# Patient Record
Sex: MALE | Race: WHITE | Employment: PART TIME | URBAN - METROPOLITAN AREA
[De-identification: names, ages, dates, MRNs, and addresses within clinical notes are randomized per-mention and may not be internally consistent; named-entity substitution may affect disease eponyms.]

---

## 2017-01-05 ENCOUNTER — OFFICE VISIT (OUTPATIENT)
Dept: FAMILY MEDICINE CLINIC | Age: 27
End: 2017-01-05

## 2017-01-05 DIAGNOSIS — L03.011 PARONYCHIA, RIGHT: Primary | ICD-10-CM

## 2017-01-05 PROCEDURE — 99213 OFFICE O/P EST LOW 20 MIN: CPT | Performed by: NURSE PRACTITIONER

## 2017-01-05 RX ORDER — DOXYCYCLINE HYCLATE 100 MG
100 TABLET ORAL 2 TIMES DAILY
Qty: 20 TABLET | Refills: 0 | Status: SHIPPED | OUTPATIENT
Start: 2017-01-05 | End: 2017-01-15

## 2017-01-05 RX ORDER — MUPIROCIN CALCIUM 20 MG/G
CREAM TOPICAL
Qty: 1 TUBE | Refills: 0 | Status: SHIPPED | OUTPATIENT
Start: 2017-01-05 | End: 2017-02-04

## 2017-01-06 VITALS
SYSTOLIC BLOOD PRESSURE: 120 MMHG | RESPIRATION RATE: 18 BRPM | OXYGEN SATURATION: 97 % | BODY MASS INDEX: 25.81 KG/M2 | WEIGHT: 212 LBS | DIASTOLIC BLOOD PRESSURE: 74 MMHG | TEMPERATURE: 98.2 F | HEART RATE: 122 BPM

## 2017-01-16 ENCOUNTER — TELEPHONE (OUTPATIENT)
Dept: FAMILY MEDICINE CLINIC | Age: 27
End: 2017-01-16

## 2017-01-17 ENCOUNTER — OFFICE VISIT (OUTPATIENT)
Dept: FAMILY MEDICINE CLINIC | Age: 27
End: 2017-01-17

## 2017-01-17 VITALS
SYSTOLIC BLOOD PRESSURE: 126 MMHG | TEMPERATURE: 97.9 F | WEIGHT: 212 LBS | HEART RATE: 112 BPM | OXYGEN SATURATION: 97 % | RESPIRATION RATE: 18 BRPM | BODY MASS INDEX: 25.81 KG/M2 | DIASTOLIC BLOOD PRESSURE: 80 MMHG

## 2017-01-17 DIAGNOSIS — Z01.818 PRE-OP EXAM: ICD-10-CM

## 2017-01-17 DIAGNOSIS — L60.0 INGROWN NAIL: Primary | ICD-10-CM

## 2017-01-17 DIAGNOSIS — F64.0 GENDER DYSPHORIA IN ADULT: ICD-10-CM

## 2017-01-17 PROCEDURE — 99213 OFFICE O/P EST LOW 20 MIN: CPT | Performed by: NURSE PRACTITIONER

## 2017-01-17 ASSESSMENT — ENCOUNTER SYMPTOMS
SPUTUM PRODUCTION: 0
RESPIRATORY NEGATIVE: 1
SHORTNESS OF BREATH: 0

## 2017-01-27 RX ORDER — HYDROCODONE BITARTRATE AND ACETAMINOPHEN 5; 325 MG/1; MG/1
1 TABLET ORAL 2 TIMES DAILY PRN
Qty: 60 TABLET | Refills: 0 | Status: SHIPPED | OUTPATIENT
Start: 2017-01-27 | End: 2017-02-28 | Stop reason: SDUPTHER

## 2017-02-13 ENCOUNTER — OFFICE VISIT (OUTPATIENT)
Dept: FAMILY MEDICINE CLINIC | Age: 27
End: 2017-02-13

## 2017-02-13 VITALS
DIASTOLIC BLOOD PRESSURE: 86 MMHG | BODY MASS INDEX: 26.54 KG/M2 | SYSTOLIC BLOOD PRESSURE: 124 MMHG | OXYGEN SATURATION: 97 % | WEIGHT: 218 LBS | HEART RATE: 112 BPM

## 2017-02-13 DIAGNOSIS — M25.552 CHRONIC HIP PAIN, LEFT: Primary | ICD-10-CM

## 2017-02-13 DIAGNOSIS — Q79.62 EHLERS-DANLOS SYNDROME, BENIGN HYPERMOBILE FORM: ICD-10-CM

## 2017-02-13 DIAGNOSIS — G89.29 CHRONIC HIP PAIN, LEFT: Primary | ICD-10-CM

## 2017-02-13 PROCEDURE — 99213 OFFICE O/P EST LOW 20 MIN: CPT | Performed by: INTERNAL MEDICINE

## 2017-02-28 RX ORDER — HYDROCODONE BITARTRATE AND ACETAMINOPHEN 5; 325 MG/1; MG/1
1 TABLET ORAL 2 TIMES DAILY PRN
Qty: 60 TABLET | Refills: 0 | Status: SHIPPED | OUTPATIENT
Start: 2017-02-28 | End: 2017-03-30 | Stop reason: SDUPTHER

## 2017-04-05 RX ORDER — HYDROCODONE BITARTRATE AND ACETAMINOPHEN 5; 325 MG/1; MG/1
1 TABLET ORAL 2 TIMES DAILY PRN
Qty: 60 TABLET | Refills: 0 | Status: SHIPPED | OUTPATIENT
Start: 2017-04-05 | End: 2017-05-02 | Stop reason: SDUPTHER

## 2017-05-04 RX ORDER — HYDROCODONE BITARTRATE AND ACETAMINOPHEN 5; 325 MG/1; MG/1
1 TABLET ORAL 2 TIMES DAILY PRN
Qty: 60 TABLET | Refills: 0 | Status: SHIPPED | OUTPATIENT
Start: 2017-05-04 | End: 2017-06-19 | Stop reason: SDUPTHER

## 2017-06-19 ENCOUNTER — OFFICE VISIT (OUTPATIENT)
Dept: FAMILY MEDICINE CLINIC | Age: 27
End: 2017-06-19

## 2017-06-19 VITALS
DIASTOLIC BLOOD PRESSURE: 80 MMHG | HEART RATE: 104 BPM | OXYGEN SATURATION: 98 % | RESPIRATION RATE: 16 BRPM | BODY MASS INDEX: 26.55 KG/M2 | SYSTOLIC BLOOD PRESSURE: 126 MMHG | HEIGHT: 76 IN | WEIGHT: 218 LBS

## 2017-06-19 DIAGNOSIS — G89.29 CHRONIC HIP PAIN, LEFT: ICD-10-CM

## 2017-06-19 DIAGNOSIS — Q79.60 EHLERS-DANLOS DISEASE: Primary | ICD-10-CM

## 2017-06-19 DIAGNOSIS — M25.552 CHRONIC HIP PAIN, LEFT: ICD-10-CM

## 2017-06-19 PROCEDURE — 99213 OFFICE O/P EST LOW 20 MIN: CPT | Performed by: NURSE PRACTITIONER

## 2017-06-19 RX ORDER — BUPROPION HYDROCHLORIDE 300 MG/1
TABLET ORAL
Refills: 2 | COMMUNITY
Start: 2017-05-15 | End: 2020-01-21 | Stop reason: ALTCHOICE

## 2017-06-19 RX ORDER — HYDROCODONE BITARTRATE AND ACETAMINOPHEN 5; 325 MG/1; MG/1
1 TABLET ORAL 2 TIMES DAILY PRN
Qty: 60 TABLET | Refills: 0 | Status: SHIPPED | OUTPATIENT
Start: 2017-06-19 | End: 2017-08-07 | Stop reason: SDUPTHER

## 2017-06-19 RX ORDER — ESTRADIOL 1 MG/1
TABLET ORAL
Refills: 3 | COMMUNITY
Start: 2017-05-29 | End: 2017-09-19 | Stop reason: SDUPTHER

## 2017-06-19 RX ORDER — SPIRONOLACTONE 100 MG/1
TABLET, FILM COATED ORAL
Refills: 11 | COMMUNITY
Start: 2017-05-29 | End: 2018-07-10 | Stop reason: ALTCHOICE

## 2017-06-19 RX ORDER — BUPROPION HYDROCHLORIDE 150 MG/1
TABLET ORAL
Refills: 2 | COMMUNITY
Start: 2017-05-15 | End: 2020-01-21 | Stop reason: ALTCHOICE

## 2017-06-19 ASSESSMENT — ENCOUNTER SYMPTOMS
NAUSEA: 0
BACK PAIN: 0
CHEST TIGHTNESS: 0
CONSTIPATION: 1

## 2017-08-07 DIAGNOSIS — Q79.60 EHLERS-DANLOS DISEASE: ICD-10-CM

## 2017-08-07 DIAGNOSIS — M25.552 CHRONIC HIP PAIN, LEFT: ICD-10-CM

## 2017-08-07 DIAGNOSIS — G89.29 CHRONIC HIP PAIN, LEFT: ICD-10-CM

## 2017-08-11 RX ORDER — HYDROCODONE BITARTRATE AND ACETAMINOPHEN 5; 325 MG/1; MG/1
1 TABLET ORAL 2 TIMES DAILY PRN
Qty: 60 TABLET | Refills: 0 | Status: SHIPPED | OUTPATIENT
Start: 2017-08-11 | End: 2017-09-11 | Stop reason: SDUPTHER

## 2017-09-11 DIAGNOSIS — Q79.60 EHLERS-DANLOS DISEASE: ICD-10-CM

## 2017-09-11 DIAGNOSIS — M25.552 CHRONIC HIP PAIN, LEFT: ICD-10-CM

## 2017-09-11 DIAGNOSIS — G89.29 CHRONIC HIP PAIN, LEFT: ICD-10-CM

## 2017-09-14 RX ORDER — HYDROCODONE BITARTRATE AND ACETAMINOPHEN 5; 325 MG/1; MG/1
1 TABLET ORAL 2 TIMES DAILY PRN
Qty: 60 TABLET | Refills: 0 | Status: SHIPPED | OUTPATIENT
Start: 2017-09-14 | End: 2017-10-16 | Stop reason: SDUPTHER

## 2017-09-19 ENCOUNTER — OFFICE VISIT (OUTPATIENT)
Dept: FAMILY MEDICINE CLINIC | Age: 27
End: 2017-09-19

## 2017-09-19 VITALS
WEIGHT: 188.4 LBS | BODY MASS INDEX: 22.93 KG/M2 | OXYGEN SATURATION: 98 % | SYSTOLIC BLOOD PRESSURE: 100 MMHG | HEART RATE: 107 BPM | DIASTOLIC BLOOD PRESSURE: 70 MMHG

## 2017-09-19 DIAGNOSIS — G89.29 CHRONIC HIP PAIN, LEFT: ICD-10-CM

## 2017-09-19 DIAGNOSIS — M25.552 CHRONIC HIP PAIN, LEFT: ICD-10-CM

## 2017-09-19 DIAGNOSIS — Q79.60 EHLERS-DANLOS DISEASE: Primary | ICD-10-CM

## 2017-09-19 DIAGNOSIS — L21.9 SEBORRHEA: ICD-10-CM

## 2017-09-19 PROCEDURE — 99213 OFFICE O/P EST LOW 20 MIN: CPT | Performed by: INTERNAL MEDICINE

## 2017-09-19 RX ORDER — ESTRADIOL 2 MG/1
TABLET ORAL
Qty: 1 TABLET | Refills: 0
Start: 2017-09-19 | End: 2018-01-09 | Stop reason: DRUGHIGH

## 2017-09-19 RX ORDER — METRONIDAZOLE 7.5 MG/G
GEL TOPICAL
Qty: 45 G | Refills: 0 | Status: SHIPPED | OUTPATIENT
Start: 2017-09-19 | End: 2018-01-09 | Stop reason: SDUPTHER

## 2017-10-16 DIAGNOSIS — M25.552 CHRONIC HIP PAIN, LEFT: ICD-10-CM

## 2017-10-16 DIAGNOSIS — Q79.60 EHLERS-DANLOS DISEASE: ICD-10-CM

## 2017-10-16 DIAGNOSIS — G89.29 CHRONIC HIP PAIN, LEFT: ICD-10-CM

## 2017-10-18 RX ORDER — HYDROCODONE BITARTRATE AND ACETAMINOPHEN 5; 325 MG/1; MG/1
1 TABLET ORAL 2 TIMES DAILY PRN
Qty: 60 TABLET | Refills: 0 | Status: SHIPPED | OUTPATIENT
Start: 2017-10-18 | End: 2017-11-14 | Stop reason: SDUPTHER

## 2017-11-14 DIAGNOSIS — G89.29 CHRONIC HIP PAIN, LEFT: ICD-10-CM

## 2017-11-14 DIAGNOSIS — Q79.60 EHLERS-DANLOS DISEASE: ICD-10-CM

## 2017-11-14 DIAGNOSIS — M25.552 CHRONIC HIP PAIN, LEFT: ICD-10-CM

## 2017-11-16 NOTE — TELEPHONE ENCOUNTER
Last Seen: 9/19/2017    Last Writen: 10/18/17    Last UDS: 9/14/15    OARRS Run On: 10/16/17    Med Agreement Signed On: 7/24/15    Next Appointment: 12/22/2017    Requested Prescriptions     Pending Prescriptions Disp Refills    HYDROcodone-acetaminophen (NORCO) 5-325 MG per tablet 60 tablet 0     Sig: Take 1 tablet by mouth 2 times daily as needed for Pain .  Earliest Fill Date: 11/16/17

## 2017-11-17 RX ORDER — HYDROCODONE BITARTRATE AND ACETAMINOPHEN 5; 325 MG/1; MG/1
1 TABLET ORAL 2 TIMES DAILY PRN
Qty: 60 TABLET | Refills: 0 | Status: SHIPPED | OUTPATIENT
Start: 2017-11-17 | End: 2017-12-15 | Stop reason: SDUPTHER

## 2017-12-15 ENCOUNTER — TELEPHONE (OUTPATIENT)
Dept: FAMILY MEDICINE CLINIC | Age: 27
End: 2017-12-15

## 2017-12-15 DIAGNOSIS — G89.29 CHRONIC HIP PAIN, LEFT: ICD-10-CM

## 2017-12-15 DIAGNOSIS — M25.552 CHRONIC HIP PAIN, LEFT: ICD-10-CM

## 2017-12-15 DIAGNOSIS — Q79.60 EHLERS-DANLOS DISEASE: ICD-10-CM

## 2017-12-15 RX ORDER — HYDROCODONE BITARTRATE AND ACETAMINOPHEN 5; 325 MG/1; MG/1
1 TABLET ORAL 2 TIMES DAILY PRN
Qty: 60 TABLET | Refills: 0 | Status: SHIPPED | OUTPATIENT
Start: 2017-12-15 | End: 2018-01-24 | Stop reason: SDUPTHER

## 2017-12-15 NOTE — TELEPHONE ENCOUNTER
Last Seen: 9/19/2017    Last Written: 11/17/2017    Last UDS: 09/14/2015    OARRS Run On: 10/16/2017    Med Agreement Signed On: 06/29/2016    Next Appointment: 12/22/2017    Requested Prescriptions     Pending Prescriptions Disp Refills    HYDROcodone-acetaminophen (NORCO) 5-325 MG per tablet 60 tablet 0     Sig: Take 1 tablet by mouth 2 times daily as needed for Pain .

## 2018-01-09 ENCOUNTER — OFFICE VISIT (OUTPATIENT)
Dept: FAMILY MEDICINE CLINIC | Age: 28
End: 2018-01-09

## 2018-01-09 VITALS
HEART RATE: 135 BPM | HEIGHT: 75 IN | OXYGEN SATURATION: 95 % | SYSTOLIC BLOOD PRESSURE: 120 MMHG | WEIGHT: 178 LBS | DIASTOLIC BLOOD PRESSURE: 76 MMHG | BODY MASS INDEX: 22.13 KG/M2

## 2018-01-09 DIAGNOSIS — G89.29 CHRONIC HIP PAIN, LEFT: Primary | ICD-10-CM

## 2018-01-09 DIAGNOSIS — Q79.60 EHLERS-DANLOS DISEASE: ICD-10-CM

## 2018-01-09 DIAGNOSIS — L71.9 ROSACEA: ICD-10-CM

## 2018-01-09 DIAGNOSIS — M25.552 CHRONIC HIP PAIN, LEFT: Primary | ICD-10-CM

## 2018-01-09 PROCEDURE — G8484 FLU IMMUNIZE NO ADMIN: HCPCS | Performed by: INTERNAL MEDICINE

## 2018-01-09 PROCEDURE — 99213 OFFICE O/P EST LOW 20 MIN: CPT | Performed by: INTERNAL MEDICINE

## 2018-01-09 PROCEDURE — G8420 CALC BMI NORM PARAMETERS: HCPCS | Performed by: INTERNAL MEDICINE

## 2018-01-09 PROCEDURE — 4004F PT TOBACCO SCREEN RCVD TLK: CPT | Performed by: INTERNAL MEDICINE

## 2018-01-09 PROCEDURE — G8427 DOCREV CUR MEDS BY ELIG CLIN: HCPCS | Performed by: INTERNAL MEDICINE

## 2018-01-09 RX ORDER — HYDROCODONE BITARTRATE AND ACETAMINOPHEN 5; 325 MG/1; MG/1
1 TABLET ORAL 2 TIMES DAILY PRN
Qty: 60 TABLET | Refills: 0 | Status: CANCELLED | OUTPATIENT
Start: 2018-01-09 | End: 2018-02-08

## 2018-01-09 RX ORDER — ESTRADIOL 2 MG/1
3 TABLET ORAL DAILY
COMMUNITY

## 2018-01-09 ASSESSMENT — PATIENT HEALTH QUESTIONNAIRE - PHQ9
1. LITTLE INTEREST OR PLEASURE IN DOING THINGS: 0
2. FEELING DOWN, DEPRESSED OR HOPELESS: 0
SUM OF ALL RESPONSES TO PHQ QUESTIONS 1-9: 0
SUM OF ALL RESPONSES TO PHQ9 QUESTIONS 1 & 2: 0

## 2018-01-09 NOTE — PROGRESS NOTES
Subjective:      Patient ID: Vita Gloria is a 28 y.o. male. HPI  Follow up chronic joint hypermobility and chronic left hip and knee pain. Pain medication working well. Joint pain improved to acceptable level on current, using 1 to 2 daily. Pain can be a 7 off medication, after medication 3 to 4. Doing home exercises for hip and knee. walking with crutches. Depression stable and continues with psychiatrist Matilda Spencer. Going through gender transition. Patient's medications, allergies, past medical, surgical, social and family histories were reviewed and updated as appropriate. Review of Systems   Constitutional: Negative for fatigue and unexpected weight change. Respiratory: Negative for cough. Cardiovascular: Negative for chest pain and leg swelling. Musculoskeletal: Positive for arthralgias and gait problem (using crutches due to hip pain). Negative for myalgias and back pain. Psychiatric/Behavioral: Affect is flat. Mood is normal.      Objective:   Physical Exam   Constitutional: He is oriented to person, place, and time. He appears well-developed and well-nourished. No distress. Eyes: Conjunctivae are normal. No scleral icterus. Neck: No JVD present. No thyromegaly present. Musculoskeletal:      No edema. Left hip FROM, no crepitus or tenderness, no deformity  Neurological: He is alert and oriented to person, place, and time. Skin: No rash noted. Psychiatric: His speech is normal. Judgment and thought content normal. His affect is blunt. Vitals reviewed. Assessment:     Monika Nageotte was seen today for hip pain. Diagnoses and all orders for this visit:    Nasra-Danlos disease    Chronic hip pain, left    Other orders  -     Cancel: HYDROcodone-acetaminophen (NORCO) 5-325 MG per tablet; Take 1 tablet by mouth 2 times daily as needed for Pain for up to 30 days. -     metroNIDAZOLE (METROGEL) 0.75 % gel; Apply topically 2 times daily.       The current medical regimen is

## 2018-01-10 RX ORDER — METRONIDAZOLE 7.5 MG/G
GEL TOPICAL
Qty: 45 G | Refills: 3 | Status: SHIPPED | OUTPATIENT
Start: 2018-01-10 | End: 2018-04-10 | Stop reason: SDUPTHER

## 2018-01-21 PROBLEM — L71.9 ROSACEA: Status: ACTIVE | Noted: 2018-01-21

## 2018-01-24 DIAGNOSIS — G89.29 CHRONIC HIP PAIN, LEFT: ICD-10-CM

## 2018-01-24 DIAGNOSIS — Q79.60 EHLERS-DANLOS DISEASE: ICD-10-CM

## 2018-01-24 DIAGNOSIS — M25.552 CHRONIC HIP PAIN, LEFT: ICD-10-CM

## 2018-01-26 RX ORDER — HYDROCODONE BITARTRATE AND ACETAMINOPHEN 5; 325 MG/1; MG/1
1 TABLET ORAL 2 TIMES DAILY PRN
Qty: 60 TABLET | Refills: 0 | Status: SHIPPED | OUTPATIENT
Start: 2018-01-26 | End: 2018-03-01 | Stop reason: SDUPTHER

## 2018-03-01 DIAGNOSIS — G89.29 CHRONIC HIP PAIN, LEFT: ICD-10-CM

## 2018-03-01 DIAGNOSIS — M25.552 CHRONIC HIP PAIN, LEFT: ICD-10-CM

## 2018-03-01 DIAGNOSIS — Q79.60 EHLERS-DANLOS DISEASE: ICD-10-CM

## 2018-03-01 NOTE — TELEPHONE ENCOUNTER
Last Seen: 1/9/2018    Last Writen: 1-    Last UDS: 9-    OARRS Run On: 3-1-2018    Med Agreement Signed On: 7-    Next Appointment: 4/10/2018    Requested Prescriptions     Pending Prescriptions Disp Refills    HYDROcodone-acetaminophen (NORCO) 5-325 MG per tablet 60 tablet 0     Sig: Take 1 tablet by mouth 2 times daily as needed for Pain for up to 30 days.

## 2018-03-02 RX ORDER — HYDROCODONE BITARTRATE AND ACETAMINOPHEN 5; 325 MG/1; MG/1
1 TABLET ORAL 2 TIMES DAILY PRN
Qty: 60 TABLET | Refills: 0 | Status: SHIPPED | OUTPATIENT
Start: 2018-03-02 | End: 2018-04-03 | Stop reason: SDUPTHER

## 2018-04-03 DIAGNOSIS — G89.29 CHRONIC HIP PAIN, LEFT: ICD-10-CM

## 2018-04-03 DIAGNOSIS — Q79.60 EHLERS-DANLOS DISEASE: ICD-10-CM

## 2018-04-03 DIAGNOSIS — M25.552 CHRONIC HIP PAIN, LEFT: ICD-10-CM

## 2018-04-06 RX ORDER — HYDROCODONE BITARTRATE AND ACETAMINOPHEN 5; 325 MG/1; MG/1
1 TABLET ORAL 2 TIMES DAILY PRN
Qty: 60 TABLET | Refills: 0 | Status: SHIPPED | OUTPATIENT
Start: 2018-04-06 | End: 2018-05-14 | Stop reason: SDUPTHER

## 2018-04-10 ENCOUNTER — OFFICE VISIT (OUTPATIENT)
Dept: FAMILY MEDICINE CLINIC | Age: 28
End: 2018-04-10

## 2018-04-10 VITALS
HEART RATE: 115 BPM | DIASTOLIC BLOOD PRESSURE: 76 MMHG | SYSTOLIC BLOOD PRESSURE: 110 MMHG | WEIGHT: 176 LBS | OXYGEN SATURATION: 98 % | BODY MASS INDEX: 22 KG/M2

## 2018-04-10 DIAGNOSIS — M25.552 CHRONIC HIP PAIN, LEFT: ICD-10-CM

## 2018-04-10 DIAGNOSIS — Q79.62 EHLERS-DANLOS SYNDROME, BENIGN HYPERMOBILE FORM: Primary | ICD-10-CM

## 2018-04-10 DIAGNOSIS — F33.41 RECURRENT MAJOR DEPRESSION IN PARTIAL REMISSION (HCC): ICD-10-CM

## 2018-04-10 DIAGNOSIS — G89.29 CHRONIC HIP PAIN, LEFT: ICD-10-CM

## 2018-04-10 DIAGNOSIS — L71.9 ROSACEA: ICD-10-CM

## 2018-04-10 PROCEDURE — 99212 OFFICE O/P EST SF 10 MIN: CPT | Performed by: INTERNAL MEDICINE

## 2018-04-10 PROCEDURE — 4004F PT TOBACCO SCREEN RCVD TLK: CPT | Performed by: INTERNAL MEDICINE

## 2018-04-10 PROCEDURE — G8420 CALC BMI NORM PARAMETERS: HCPCS | Performed by: INTERNAL MEDICINE

## 2018-04-10 PROCEDURE — G8427 DOCREV CUR MEDS BY ELIG CLIN: HCPCS | Performed by: INTERNAL MEDICINE

## 2018-04-10 RX ORDER — METRONIDAZOLE 7.5 MG/G
GEL TOPICAL
Qty: 45 G | Refills: 3 | Status: SHIPPED | OUTPATIENT
Start: 2018-04-10 | End: 2018-07-10 | Stop reason: SDUPTHER

## 2018-04-10 RX ORDER — DOCUSATE SODIUM 100 MG/1
100 CAPSULE, LIQUID FILLED ORAL 2 TIMES DAILY PRN
Qty: 60 CAPSULE | Refills: 5 | Status: SHIPPED | OUTPATIENT
Start: 2018-04-10 | End: 2018-07-10 | Stop reason: SDUPTHER

## 2018-05-14 DIAGNOSIS — M25.552 CHRONIC HIP PAIN, LEFT: ICD-10-CM

## 2018-05-14 DIAGNOSIS — Q79.60 EHLERS-DANLOS DISEASE: ICD-10-CM

## 2018-05-14 DIAGNOSIS — G89.29 CHRONIC HIP PAIN, LEFT: ICD-10-CM

## 2018-05-16 RX ORDER — HYDROCODONE BITARTRATE AND ACETAMINOPHEN 5; 325 MG/1; MG/1
1 TABLET ORAL 2 TIMES DAILY PRN
Qty: 60 TABLET | Refills: 0 | Status: SHIPPED | OUTPATIENT
Start: 2018-05-16 | End: 2018-06-26 | Stop reason: SDUPTHER

## 2018-06-26 DIAGNOSIS — M25.552 CHRONIC HIP PAIN, LEFT: ICD-10-CM

## 2018-06-26 DIAGNOSIS — Q79.60 EHLERS-DANLOS DISEASE: ICD-10-CM

## 2018-06-26 DIAGNOSIS — G89.29 CHRONIC HIP PAIN, LEFT: ICD-10-CM

## 2018-06-26 RX ORDER — HYDROCODONE BITARTRATE AND ACETAMINOPHEN 5; 325 MG/1; MG/1
1 TABLET ORAL 2 TIMES DAILY PRN
Qty: 60 TABLET | Refills: 0 | Status: SHIPPED | OUTPATIENT
Start: 2018-06-26 | End: 2018-08-02 | Stop reason: SDUPTHER

## 2018-07-10 ENCOUNTER — OFFICE VISIT (OUTPATIENT)
Dept: FAMILY MEDICINE CLINIC | Age: 28
End: 2018-07-10

## 2018-07-10 VITALS
HEART RATE: 116 BPM | SYSTOLIC BLOOD PRESSURE: 110 MMHG | WEIGHT: 177 LBS | OXYGEN SATURATION: 98 % | DIASTOLIC BLOOD PRESSURE: 72 MMHG | HEIGHT: 75 IN | BODY MASS INDEX: 22.01 KG/M2

## 2018-07-10 DIAGNOSIS — L71.9 ROSACEA: ICD-10-CM

## 2018-07-10 DIAGNOSIS — G89.29 CHRONIC HIP PAIN, LEFT: ICD-10-CM

## 2018-07-10 DIAGNOSIS — M25.552 CHRONIC HIP PAIN, LEFT: ICD-10-CM

## 2018-07-10 DIAGNOSIS — Q79.60 EHLERS-DANLOS DISEASE: Primary | ICD-10-CM

## 2018-07-10 PROCEDURE — G8420 CALC BMI NORM PARAMETERS: HCPCS | Performed by: INTERNAL MEDICINE

## 2018-07-10 PROCEDURE — 99213 OFFICE O/P EST LOW 20 MIN: CPT | Performed by: INTERNAL MEDICINE

## 2018-07-10 PROCEDURE — G8427 DOCREV CUR MEDS BY ELIG CLIN: HCPCS | Performed by: INTERNAL MEDICINE

## 2018-07-10 PROCEDURE — 4004F PT TOBACCO SCREEN RCVD TLK: CPT | Performed by: INTERNAL MEDICINE

## 2018-07-10 RX ORDER — DOCUSATE SODIUM 100 MG/1
100 CAPSULE, LIQUID FILLED ORAL 2 TIMES DAILY PRN
Qty: 60 CAPSULE | Refills: 5 | Status: SHIPPED | OUTPATIENT
Start: 2018-07-10 | End: 2020-10-09

## 2018-07-10 RX ORDER — METRONIDAZOLE 7.5 MG/G
GEL TOPICAL
Qty: 45 G | Refills: 3 | Status: SHIPPED | OUTPATIENT
Start: 2018-07-10 | End: 2018-10-16 | Stop reason: SDUPTHER

## 2018-08-02 DIAGNOSIS — M25.552 CHRONIC HIP PAIN, LEFT: ICD-10-CM

## 2018-08-02 DIAGNOSIS — Q79.60 EHLERS-DANLOS DISEASE: ICD-10-CM

## 2018-08-02 DIAGNOSIS — G89.29 CHRONIC HIP PAIN, LEFT: ICD-10-CM

## 2018-08-03 RX ORDER — HYDROCODONE BITARTRATE AND ACETAMINOPHEN 5; 325 MG/1; MG/1
1 TABLET ORAL 2 TIMES DAILY PRN
Qty: 60 TABLET | Refills: 0 | Status: SHIPPED | OUTPATIENT
Start: 2018-08-03 | End: 2018-09-18 | Stop reason: SDUPTHER

## 2018-09-18 DIAGNOSIS — G89.29 CHRONIC HIP PAIN, LEFT: ICD-10-CM

## 2018-09-18 DIAGNOSIS — M25.552 CHRONIC HIP PAIN, LEFT: ICD-10-CM

## 2018-09-18 DIAGNOSIS — Q79.60 EHLERS-DANLOS DISEASE: ICD-10-CM

## 2018-09-19 RX ORDER — HYDROCODONE BITARTRATE AND ACETAMINOPHEN 5; 325 MG/1; MG/1
1 TABLET ORAL 2 TIMES DAILY PRN
Qty: 60 TABLET | Refills: 0 | Status: SHIPPED | OUTPATIENT
Start: 2018-09-19 | End: 2018-10-22 | Stop reason: SDUPTHER

## 2018-10-16 ENCOUNTER — OFFICE VISIT (OUTPATIENT)
Dept: FAMILY MEDICINE CLINIC | Age: 28
End: 2018-10-16
Payer: MEDICAID

## 2018-10-16 VITALS
OXYGEN SATURATION: 98 % | HEART RATE: 100 BPM | DIASTOLIC BLOOD PRESSURE: 62 MMHG | RESPIRATION RATE: 12 BRPM | WEIGHT: 177 LBS | TEMPERATURE: 98.9 F | BODY MASS INDEX: 22.01 KG/M2 | HEIGHT: 75 IN | SYSTOLIC BLOOD PRESSURE: 92 MMHG

## 2018-10-16 DIAGNOSIS — Z01.818 PRE-OP EVALUATION: Primary | ICD-10-CM

## 2018-10-16 DIAGNOSIS — Q79.60 EHLERS-DANLOS DISEASE: ICD-10-CM

## 2018-10-16 DIAGNOSIS — G89.29 CHRONIC HIP PAIN, LEFT: ICD-10-CM

## 2018-10-16 DIAGNOSIS — M25.552 CHRONIC HIP PAIN, LEFT: ICD-10-CM

## 2018-10-16 DIAGNOSIS — H26.491 OTHER SECONDARY CATARACT OF RIGHT EYE: ICD-10-CM

## 2018-10-16 PROCEDURE — G8484 FLU IMMUNIZE NO ADMIN: HCPCS | Performed by: INTERNAL MEDICINE

## 2018-10-16 PROCEDURE — 4004F PT TOBACCO SCREEN RCVD TLK: CPT | Performed by: INTERNAL MEDICINE

## 2018-10-16 PROCEDURE — 99214 OFFICE O/P EST MOD 30 MIN: CPT | Performed by: INTERNAL MEDICINE

## 2018-10-16 PROCEDURE — G8420 CALC BMI NORM PARAMETERS: HCPCS | Performed by: INTERNAL MEDICINE

## 2018-10-16 PROCEDURE — G8427 DOCREV CUR MEDS BY ELIG CLIN: HCPCS | Performed by: INTERNAL MEDICINE

## 2018-10-16 RX ORDER — METRONIDAZOLE 7.5 MG/G
GEL TOPICAL
Qty: 45 G | Refills: 5 | Status: SHIPPED | OUTPATIENT
Start: 2018-10-16 | End: 2019-07-14 | Stop reason: SDUPTHER

## 2018-10-22 DIAGNOSIS — Q79.60 EHLERS-DANLOS DISEASE: ICD-10-CM

## 2018-10-22 DIAGNOSIS — M25.552 CHRONIC HIP PAIN, LEFT: ICD-10-CM

## 2018-10-22 DIAGNOSIS — G89.29 CHRONIC HIP PAIN, LEFT: ICD-10-CM

## 2018-10-24 RX ORDER — HYDROCODONE BITARTRATE AND ACETAMINOPHEN 5; 325 MG/1; MG/1
1 TABLET ORAL 2 TIMES DAILY PRN
Qty: 60 TABLET | Refills: 0 | Status: SHIPPED | OUTPATIENT
Start: 2018-10-24 | End: 2018-11-23

## 2018-11-14 ENCOUNTER — TELEPHONE (OUTPATIENT)
Dept: FAMILY MEDICINE CLINIC | Age: 28
End: 2018-11-14

## 2018-12-05 ENCOUNTER — TELEPHONE (OUTPATIENT)
Dept: FAMILY MEDICINE CLINIC | Age: 28
End: 2018-12-05

## 2018-12-05 DIAGNOSIS — M25.552 CHRONIC HIP PAIN, LEFT: ICD-10-CM

## 2018-12-05 DIAGNOSIS — G89.29 CHRONIC HIP PAIN, LEFT: ICD-10-CM

## 2018-12-05 DIAGNOSIS — Q79.60 EHLERS-DANLOS DISEASE: ICD-10-CM

## 2018-12-05 RX ORDER — HYDROCODONE BITARTRATE AND ACETAMINOPHEN 5; 325 MG/1; MG/1
1 TABLET ORAL 2 TIMES DAILY PRN
Qty: 60 TABLET | Refills: 0 | Status: SHIPPED | OUTPATIENT
Start: 2018-12-05 | End: 2019-01-22 | Stop reason: SDUPTHER

## 2018-12-05 RX ORDER — HYDROCODONE BITARTRATE AND ACETAMINOPHEN 5; 325 MG/1; MG/1
1 TABLET ORAL 2 TIMES DAILY PRN
Qty: 60 TABLET | Refills: 0 | Status: CANCELLED | OUTPATIENT
Start: 2018-12-05 | End: 2019-01-04

## 2018-12-05 NOTE — TELEPHONE ENCOUNTER
Patient called asking for [place] refill    Last Seen: 10/16/2018    Last Written: 10/24/18 60 tablet 0 refills    Last UDS: 9/14/15    OARRS Run On: 5/15/18    Med Agreement Signed On: 6/29/16  Next Appointment: 1/22/2019    Requested Prescriptions      No prescriptions requested or ordered in this encounter

## 2019-01-22 ENCOUNTER — OFFICE VISIT (OUTPATIENT)
Dept: FAMILY MEDICINE CLINIC | Age: 29
End: 2019-01-22
Payer: MEDICAID

## 2019-01-22 VITALS
WEIGHT: 176 LBS | BODY MASS INDEX: 21.88 KG/M2 | DIASTOLIC BLOOD PRESSURE: 70 MMHG | HEART RATE: 104 BPM | HEIGHT: 75 IN | SYSTOLIC BLOOD PRESSURE: 110 MMHG | OXYGEN SATURATION: 98 %

## 2019-01-22 DIAGNOSIS — Q79.60 EHLERS-DANLOS DISEASE: ICD-10-CM

## 2019-01-22 DIAGNOSIS — G89.29 CHRONIC HIP PAIN, LEFT: ICD-10-CM

## 2019-01-22 DIAGNOSIS — M25.552 CHRONIC HIP PAIN, LEFT: ICD-10-CM

## 2019-01-22 PROCEDURE — 4004F PT TOBACCO SCREEN RCVD TLK: CPT | Performed by: INTERNAL MEDICINE

## 2019-01-22 PROCEDURE — G8420 CALC BMI NORM PARAMETERS: HCPCS | Performed by: INTERNAL MEDICINE

## 2019-01-22 PROCEDURE — G8484 FLU IMMUNIZE NO ADMIN: HCPCS | Performed by: INTERNAL MEDICINE

## 2019-01-22 PROCEDURE — G8427 DOCREV CUR MEDS BY ELIG CLIN: HCPCS | Performed by: INTERNAL MEDICINE

## 2019-01-22 PROCEDURE — 99213 OFFICE O/P EST LOW 20 MIN: CPT | Performed by: INTERNAL MEDICINE

## 2019-01-22 RX ORDER — METRONIDAZOLE 7.5 MG/G
GEL TOPICAL
Qty: 45 G | Refills: 5 | Status: CANCELLED | OUTPATIENT
Start: 2019-01-22

## 2019-01-23 RX ORDER — HYDROCODONE BITARTRATE AND ACETAMINOPHEN 5; 325 MG/1; MG/1
1 TABLET ORAL 2 TIMES DAILY PRN
Qty: 60 TABLET | Refills: 0 | Status: SHIPPED | OUTPATIENT
Start: 2019-01-23 | End: 2019-04-10 | Stop reason: SDUPTHER

## 2019-03-05 DIAGNOSIS — M24.9 HYPERMOBILE JOINTS: Primary | ICD-10-CM

## 2019-03-06 RX ORDER — OXYCODONE HYDROCHLORIDE AND ACETAMINOPHEN 5; 325 MG/1; MG/1
1 TABLET ORAL 2 TIMES DAILY
Qty: 60 TABLET | Refills: 0 | Status: SHIPPED | OUTPATIENT
Start: 2019-03-06 | End: 2019-04-05

## 2019-03-28 ENCOUNTER — TELEPHONE (OUTPATIENT)
Dept: FAMILY MEDICINE CLINIC | Age: 29
End: 2019-03-28

## 2019-04-01 ENCOUNTER — TELEPHONE (OUTPATIENT)
Dept: FAMILY MEDICINE CLINIC | Age: 29
End: 2019-04-01

## 2019-04-01 NOTE — TELEPHONE ENCOUNTER
Pt states the letter you wrote needs to specifically say that he is exempt from the work release program.  They are still expecting him to show up there on Thursday. Fax to Millicent Barfield at [phone].

## 2019-04-01 NOTE — TELEPHONE ENCOUNTER
Informed alexandra of rochelle's stipulations she would like to know if you could put that in writing and sign the letter.  [phone]

## 2019-04-09 DIAGNOSIS — Q79.60 EHLERS-DANLOS DISEASE: ICD-10-CM

## 2019-04-09 DIAGNOSIS — M25.552 CHRONIC HIP PAIN, LEFT: ICD-10-CM

## 2019-04-09 DIAGNOSIS — G89.29 CHRONIC HIP PAIN, LEFT: ICD-10-CM

## 2019-04-09 NOTE — TELEPHONE ENCOUNTER
Last Seen: 1/22/2019    Last Written: 01/23/2019     Last UDS: 09/14/2015     OARRS Run On: 03/05/2019    Med Agreement Signed On: 07/24/2015    Next Appointment: 7/23/2019    Requested Prescriptions     Pending Prescriptions Disp Refills    HYDROcodone-acetaminophen (NORCO) 5-325 MG per tablet 60 tablet 0     Sig: Take 1 tablet by mouth 2 times daily as needed for Pain for up to 30 days.

## 2019-04-10 RX ORDER — HYDROCODONE BITARTRATE AND ACETAMINOPHEN 5; 325 MG/1; MG/1
1 TABLET ORAL 2 TIMES DAILY PRN
Qty: 60 TABLET | Refills: 0 | Status: SHIPPED | OUTPATIENT
Start: 2019-04-10 | End: 2019-07-14 | Stop reason: SDUPTHER

## 2019-07-12 DIAGNOSIS — Q79.60 EHLERS-DANLOS DISEASE: ICD-10-CM

## 2019-07-12 DIAGNOSIS — G89.29 CHRONIC HIP PAIN, LEFT: ICD-10-CM

## 2019-07-12 DIAGNOSIS — M25.552 CHRONIC HIP PAIN, LEFT: ICD-10-CM

## 2019-07-12 NOTE — TELEPHONE ENCOUNTER
Pt requesting refill on HYDROcodone-acetaminophen (NORCO) 5-325 MG per tablet     metroNIDAZOLE (METROGEL) 0.75 % gel         [facility]/pharmacy #[id number]- [place], [place] - [address] [phone] [facility] [phone]    Last visit: 1/22/19  Next visit: 7/23/19

## 2019-07-14 RX ORDER — METRONIDAZOLE 7.5 MG/G
GEL TOPICAL
Qty: 45 G | Refills: 5 | Status: SHIPPED | OUTPATIENT
Start: 2019-07-14 | End: 2020-10-09

## 2019-07-14 RX ORDER — HYDROCODONE BITARTRATE AND ACETAMINOPHEN 5; 325 MG/1; MG/1
1 TABLET ORAL 2 TIMES DAILY PRN
Qty: 60 TABLET | Refills: 0 | Status: SHIPPED | OUTPATIENT
Start: 2019-07-14 | End: 2019-09-12 | Stop reason: SDUPTHER

## 2019-07-23 ENCOUNTER — OFFICE VISIT (OUTPATIENT)
Dept: FAMILY MEDICINE CLINIC | Age: 29
End: 2019-07-23
Payer: MEDICAID

## 2019-07-23 VITALS
SYSTOLIC BLOOD PRESSURE: 110 MMHG | BODY MASS INDEX: 21.37 KG/M2 | WEIGHT: 171 LBS | DIASTOLIC BLOOD PRESSURE: 68 MMHG | OXYGEN SATURATION: 97 % | HEART RATE: 106 BPM

## 2019-07-23 DIAGNOSIS — Z23 NEED FOR TDAP VACCINATION: ICD-10-CM

## 2019-07-23 DIAGNOSIS — Q79.62 EHLERS-DANLOS SYNDROME, BENIGN HYPERMOBILE FORM: ICD-10-CM

## 2019-07-23 DIAGNOSIS — L71.9 ROSACEA: ICD-10-CM

## 2019-07-23 DIAGNOSIS — G89.29 CHRONIC HIP PAIN, LEFT: Primary | ICD-10-CM

## 2019-07-23 DIAGNOSIS — R63.4 WEIGHT LOSS OF MORE THAN 10% BODY WEIGHT: ICD-10-CM

## 2019-07-23 DIAGNOSIS — Z11.4 SCREENING FOR HIV (HUMAN IMMUNODEFICIENCY VIRUS): ICD-10-CM

## 2019-07-23 DIAGNOSIS — F33.41 RECURRENT MAJOR DEPRESSION IN PARTIAL REMISSION (HCC): ICD-10-CM

## 2019-07-23 DIAGNOSIS — M25.552 CHRONIC HIP PAIN, LEFT: Primary | ICD-10-CM

## 2019-07-23 PROBLEM — H26.40 SECONDARY CATARACT OF RIGHT EYE: Status: ACTIVE | Noted: 2019-01-16

## 2019-07-23 PROBLEM — K59.03 DRUG-INDUCED CONSTIPATION: Status: ACTIVE | Noted: 2019-04-12

## 2019-07-23 PROBLEM — F41.1 GAD (GENERALIZED ANXIETY DISORDER): Status: ACTIVE | Noted: 2019-04-12

## 2019-07-23 PROBLEM — Z78.9 MALE-TO-FEMALE TRANSGENDER PERSON: Status: ACTIVE | Noted: 2019-04-10

## 2019-07-23 PROBLEM — Z96.1 PRESENCE OF ARTIFICIAL INTRA-OCULAR LENS: Status: ACTIVE | Noted: 2019-04-12

## 2019-07-23 PROCEDURE — G8428 CUR MEDS NOT DOCUMENT: HCPCS | Performed by: INTERNAL MEDICINE

## 2019-07-23 PROCEDURE — G8420 CALC BMI NORM PARAMETERS: HCPCS | Performed by: INTERNAL MEDICINE

## 2019-07-23 PROCEDURE — 4004F PT TOBACCO SCREEN RCVD TLK: CPT | Performed by: INTERNAL MEDICINE

## 2019-07-23 PROCEDURE — 99214 OFFICE O/P EST MOD 30 MIN: CPT | Performed by: INTERNAL MEDICINE

## 2019-07-23 NOTE — PATIENT INSTRUCTIONS
Patient Education        High-Calorie and High-Protein Diet: Care Instructions  Your Care Instructions    A high-calorie, high-protein diet gives you more energy and extra nutrition to help your body heal. Your doctor and dietitian can help you design a diet based on your health and what you prefer to eat. Always talk with your doctor or dietitian before you make changes in your diet. Follow-up care is a key part of your treatment and safety. Be sure to make and go to all appointments, and call your doctor if you are having problems. It's also a good idea to know your test results and keep a list of the medicines you take. How can you care for yourself at home? · Eat three meals a day, plus snacks in between and at bedtime. · Include favorite foods in your meals. This will help make meals more pleasant. · Drink your beverage at the end of the meal, because drinking before or during the meal can fill you up. · Eat high-protein foods, such as:  ? Meat, fish, and poultry. ? Milk and milk products. Add powdered milk to other foods (such as pudding or soups) to boost the protein. ? Eggs. ? Cooked dried beans and peas. ? Peanut butter, nuts, and seeds. ? Tofu.  ? Cheeses. ? Protein bars. · Eat high-calorie foods, such as:  ? Butter, honey, and brown sugar, added to foods to make them taste better. ? Oils, sauces, and gravies. ? Peanut butter. ? Whole milk, yogurt, mayonnaise, and sour cream.  ? Granola cereal with fruit and granola bars. ? Muffins, pancakes, waffles, and other breads. ? Milkshakes, puddings, and custard. · Try high-energy drinks, such as Ensure, Boost, or instant breakfast drinks, if you have trouble eating solid foods. They will give you both calories and protein. Soups and smoothies also are good sources of nutrition. · Keep snacks around that are easy to eat, such as pudding, energy bars, ice cream, and flavored ice pops.   · If you can, take a walk before you eat, to make you hungrier. · Do not waste energy eating foods that do not give you much nutrition, such as potato chips, candy bars, and soft drinks. · Drink plenty of fluids. If you have kidney, heart, or liver disease and have to limit fluids, talk with your doctor before you increase the amount of fluids you drink. Where can you learn more? Go to [email or web address] org and sign in to your [employer] account. Enter J005 in the [employer] box to learn more about \"High-Calorie and High-Protein Diet: Care Instructions. \"     If you do not have an account, please click on the \"Sign Up Now\" link. Current as of: November 7, 2018  Content Version: 12.0  © 8969-0907 Healthwise, Incorporated. Care instructions adapted under license by [facility] ([facility]). If you have questions about a medical condition or this instruction, always ask your healthcare professional. [address] any warranty or liability for your use of this information.

## 2019-09-11 DIAGNOSIS — M25.552 CHRONIC HIP PAIN, LEFT: ICD-10-CM

## 2019-09-11 DIAGNOSIS — Q79.60 EHLERS-DANLOS DISEASE: ICD-10-CM

## 2019-09-11 DIAGNOSIS — G89.29 CHRONIC HIP PAIN, LEFT: ICD-10-CM

## 2019-09-12 RX ORDER — HYDROCODONE BITARTRATE AND ACETAMINOPHEN 5; 325 MG/1; MG/1
1 TABLET ORAL 2 TIMES DAILY PRN
Qty: 60 TABLET | Refills: 0 | Status: SHIPPED | OUTPATIENT
Start: 2019-09-12 | End: 2019-10-16 | Stop reason: SDUPTHER

## 2019-10-14 DIAGNOSIS — Q79.60 EHLERS-DANLOS DISEASE: ICD-10-CM

## 2019-10-14 DIAGNOSIS — M25.552 CHRONIC HIP PAIN, LEFT: ICD-10-CM

## 2019-10-14 DIAGNOSIS — G89.29 CHRONIC HIP PAIN, LEFT: ICD-10-CM

## 2019-10-16 RX ORDER — HYDROCODONE BITARTRATE AND ACETAMINOPHEN 5; 325 MG/1; MG/1
1 TABLET ORAL 2 TIMES DAILY PRN
Qty: 60 TABLET | Refills: 0 | Status: SHIPPED | OUTPATIENT
Start: 2019-10-16 | End: 2019-11-25

## 2019-11-25 DIAGNOSIS — G89.29 CHRONIC HIP PAIN, LEFT: ICD-10-CM

## 2019-11-25 DIAGNOSIS — Q79.60 EHLERS-DANLOS DISEASE: ICD-10-CM

## 2019-11-25 DIAGNOSIS — M25.552 CHRONIC HIP PAIN, LEFT: ICD-10-CM

## 2019-11-25 RX ORDER — HYDROCODONE BITARTRATE AND ACETAMINOPHEN 5; 325 MG/1; MG/1
1 TABLET ORAL 2 TIMES DAILY PRN
Qty: 60 TABLET | Refills: 0 | Status: SHIPPED | OUTPATIENT
Start: 2019-11-25 | End: 2020-01-10 | Stop reason: SDUPTHER

## 2020-01-09 NOTE — TELEPHONE ENCOUNTER
Last office visit 7/23/2019     Last written 11/258/2019, 30 dayswith no refills. Next office visit scheduled 2/3/2020    Requested Prescriptions     Pending Prescriptions Disp Refills    HYDROcodone-acetaminophen (NORCO) 5-325 MG per tablet 60 tablet 0     Sig: Take 1 tablet by mouth 2 times daily as needed for Pain for up to 30 days. “You can access the FollowHealth Patient Portal, offered by [facility], by registering with the following website: [email or web address]”

## 2020-01-10 RX ORDER — HYDROCODONE BITARTRATE AND ACETAMINOPHEN 5; 325 MG/1; MG/1
1 TABLET ORAL 2 TIMES DAILY PRN
Qty: 60 TABLET | Refills: 0 | Status: SHIPPED | OUTPATIENT
Start: 2020-01-10 | End: 2020-02-18 | Stop reason: SDUPTHER

## 2020-01-20 PROBLEM — H25.042 POSTERIOR SUBCAPSULAR POLAR AGE-RELATED CATARACT, LEFT EYE: Status: ACTIVE | Noted: 2020-01-20

## 2020-01-20 PROBLEM — H35.371 EPIRETINAL MEMBRANE (ERM) OF RIGHT EYE: Status: ACTIVE | Noted: 2020-01-20

## 2020-01-20 PROBLEM — H43.811 PVD (POSTERIOR VITREOUS DETACHMENT), RIGHT: Status: ACTIVE | Noted: 2020-01-20

## 2020-01-20 PROBLEM — H25.11 AGE-RELATED NUCLEAR CATARACT, RIGHT EYE: Status: ACTIVE | Noted: 2020-01-20

## 2020-01-21 ENCOUNTER — OFFICE VISIT (OUTPATIENT)
Dept: FAMILY MEDICINE CLINIC | Age: 30
End: 2020-01-21
Payer: MEDICAID

## 2020-01-21 VITALS
HEART RATE: 114 BPM | HEIGHT: 75 IN | TEMPERATURE: 97.8 F | DIASTOLIC BLOOD PRESSURE: 66 MMHG | WEIGHT: 160 LBS | BODY MASS INDEX: 19.89 KG/M2 | SYSTOLIC BLOOD PRESSURE: 108 MMHG | OXYGEN SATURATION: 97 %

## 2020-01-21 PROCEDURE — 4004F PT TOBACCO SCREEN RCVD TLK: CPT | Performed by: INTERNAL MEDICINE

## 2020-01-21 PROCEDURE — 99214 OFFICE O/P EST MOD 30 MIN: CPT | Performed by: INTERNAL MEDICINE

## 2020-01-21 PROCEDURE — G8427 DOCREV CUR MEDS BY ELIG CLIN: HCPCS | Performed by: INTERNAL MEDICINE

## 2020-01-21 PROCEDURE — G8484 FLU IMMUNIZE NO ADMIN: HCPCS | Performed by: INTERNAL MEDICINE

## 2020-01-21 PROCEDURE — G8420 CALC BMI NORM PARAMETERS: HCPCS | Performed by: INTERNAL MEDICINE

## 2020-01-21 ASSESSMENT — ENCOUNTER SYMPTOMS
SHORTNESS OF BREATH: 0
COUGH: 0

## 2020-01-21 NOTE — PROGRESS NOTES
2020     [facility]  1990) is a 26 y.o. male, here for evaluation of the following medical concerns:  Chief Complaint   Patient presents with    Referral - [address] referral for general     Back Pain     takes [facility]        HPI  Follow up chronic joint hypermobility and chronic left hip and knee pain, as well as depression and rosacea. Pain medication working well per patient. Pain can be a 7 off medication, after medication 3 to 4. Doing home exercises for hip and knee. walking with crutches. Depression stable and continues with psychiatrist, doing better in trintellex and Wellbutrin. [address] [facility]. Going through gender transition. Sees Dr. Curt Myaa at [facility]. Moved from parents home and is here with a  today. Patient's medications, allergies, past medical, surgical, social and family histories were reviewed and updated as appropriate. Review of Systems   Constitutional: Negative for fatigue. Respiratory: Negative for cough and shortness of breath. Cardiovascular: Negative for chest pain and leg swelling. Musculoskeletal: Positive for arthralgias and gait problem. Negative for joint swelling and myalgias. Neurological: Negative for dizziness and headaches. Prior to Visit Medications    Medication Sig Taking? Authorizing Provider   HYDROcodone-acetaminophen (NORCO) 5-325 MG per tablet Take 1 tablet by mouth 2 times daily as needed for Pain for up to 30 days. Dany Matute MD   metroNIDAZOLE (METROGEL) 0.75 % gel Apply topically 2 times daily.   Dany Matute MD   leuprolide (LUPRON) 3.75 MG injection Inject 3.75 mg into the muscle every 30 days  Historical Provider, MD   docusate sodium (COLACE) 100 MG capsule Take 1 capsule by mouth 2 times daily as needed for Constipation  Dany Matute MD   estradiol (ESTRACE) 2 MG tablet Take 4 mg by mouth daily   Historical Provider, MD   VORTIoxetine (TRINTELLIX) 10 MG TABS tablet Take 10 mg ASSESSMENT/PLAN:  Juve Bourne was seen today for referral - general and back pain. Diagnoses and all orders for this visit:    Chronic left hip pain  -     Cancel: Drug Panel-PM-HI Res-UR Interp-A  -     Ambulatory referral to [address] [place] [address] Miguelangel    Recurrent major depression in partial remission ([facility] [address]  -     Ambulatory referral to Home Health    Nasra-Danlos disease  -     CBC WITH AUTO DIFFERENTIAL  -     COMPREHENSIVE METABOLIC PANEL  -     LIPID PANEL  -     Ambulatory referral to [address] [place] [address] Gera Beltrán    Encounter for chronic pain management  -     Cancel: Drug Panel-PM-HI Res-UR Interp-A  -     Drug Panel-PM-HI Res-UR Interp-A          No follow-ups on file. An electronic signature was used to authenticate this note.     --Christian Schaumann, MD on 1/21/2020 at 3:12 PM

## 2020-01-25 LAB
6-ACETYLMORPHINE: NOT DETECTED
7-AMINOCLONAZEPAM: NOT DETECTED
ALPHA-OH-ALPRAZOLAM: NOT DETECTED
ALPRAZOLAM: NOT DETECTED
AMPHETAMINE: NOT DETECTED
BARBITURATES: NOT DETECTED
BENZOYLECGONINE: NOT DETECTED
BUPRENORPHINE: NOT DETECTED
CARISOPRODOL: NOT DETECTED
CLONAZEPAM: NOT DETECTED
CODEINE: NOT DETECTED
CREATININE URINE: 391.5 MG/DL (ref 20–400)
DIAZEPAM: NOT DETECTED
DRUGS EXPECTED: NORMAL
EER PAIN MGT DRUG PANEL, HIGH RES/EMIT U: NORMAL
ETHYL GLUCURONIDE: NOT DETECTED
FENTANYL: NOT DETECTED
HYDROCODONE: PRESENT
HYDROMORPHONE: NOT DETECTED
LORAZEPAM: NOT DETECTED
MARIJUANA METABOLITE: PRESENT
MDA: NOT DETECTED
MDEA: NOT DETECTED
MDMA URINE: NOT DETECTED
MEPERIDINE: NOT DETECTED
METHADONE: NOT DETECTED
METHAMPHETAMINE: NOT DETECTED
METHYLPHENIDATE: NOT DETECTED
MIDAZOLAM: NOT DETECTED
MORPHINE: NOT DETECTED
NORBUPRENORPHINE, FREE: NOT DETECTED
NORDIAZEPAM: NOT DETECTED
NORFENTANYL: NOT DETECTED
NORHYDROCODONE, URINE: PRESENT
NOROXYCODONE: NOT DETECTED
NOROXYMORPHONE, URINE: NOT DETECTED
OXAZEPAM: NOT DETECTED
OXYCODONE: NOT DETECTED
OXYMORPHONE: NOT DETECTED
PAIN MANAGEMENT DRUG PANEL: NORMAL
PAIN MANAGEMENT DRUG PANEL: NORMAL
PCP: NOT DETECTED
PHENTERMINE: NOT DETECTED
PROPOXYPHENE: NOT DETECTED
TAPENTADOL, URINE: NOT DETECTED
TAPENTADOL-O-SULFATE, URINE: NOT DETECTED
TEMAZEPAM: NOT DETECTED
TRAMADOL: NOT DETECTED
ZOLPIDEM: NOT DETECTED

## 2020-02-04 ENCOUNTER — TELEPHONE (OUTPATIENT)
Dept: FAMILY MEDICINE CLINIC | Age: 30
End: 2020-02-04

## 2020-02-07 NOTE — TELEPHONE ENCOUNTER
Noted. Pt will need to set up with the company his insurance tells him to use.   We can fax the referral to that company

## 2020-02-10 ENCOUNTER — TELEPHONE (OUTPATIENT)
Dept: FAMILY MEDICINE CLINIC | Age: 30
End: 2020-02-10

## 2020-02-10 NOTE — TELEPHONE ENCOUNTER
Vernell Benito. Awilda Chambers. Received referral from our office. Unable to see patient. Currently at capacity.

## 2020-02-10 NOTE — TELEPHONE ENCOUNTER
Received fax today of list of home health providers on pt's insurance.   I faxed pt's referral to [facility] [address]    [phone] (home)   I spoke to pt and informed him I will fax over referral and to be expecting a call form [facility] DEACONESS

## 2020-02-10 NOTE — TELEPHONE ENCOUNTER
Noted. I will fax to [address] [facility] [phone] to see if they are able to accommodate pt.

## 2020-02-10 NOTE — TELEPHONE ENCOUNTER
[facility], Yvone Cooks [phone]    Not able to take. Do not accept [place]. They are a [place] office. The [facility] office phone is [phone]. She said they may be able to take him.

## 2020-02-12 ENCOUNTER — TELEPHONE (OUTPATIENT)
Dept: FAMILY MEDICINE CLINIC | Age: 30
End: 2020-02-12

## 2020-02-12 NOTE — TELEPHONE ENCOUNTER
I called LM on [phone] [facility] [address]   [phone] - to call the office. I need to see if they can see the patient for home health (see referral) Please tell them pt has [facility] care ins. I need their fax number to send the referral.     I called pt to let him know I'm still working on this and trying to get someone off the list he provided to except his ins. Pt stated he does drive a car and isn't homebound.

## 2020-02-12 NOTE — TELEPHONE ENCOUNTER
[address] states patient is not home bound so  he would not qualify for home care assistance. Also, they do not take his insurance.

## 2020-02-13 ENCOUNTER — TELEPHONE (OUTPATIENT)
Dept: FAMILY MEDICINE CLINIC | Age: 30
End: 2020-02-13

## 2020-02-13 NOTE — TELEPHONE ENCOUNTER
Spoke with Kelly Serra and faxed the necessary information to them. They will attempt to do process the referral but they state Elodie Soulier is very strict about patients that are not housebound.

## 2020-02-18 ENCOUNTER — OFFICE VISIT (OUTPATIENT)
Dept: FAMILY MEDICINE CLINIC | Age: 30
End: 2020-02-18
Payer: MEDICAID

## 2020-02-18 VITALS
HEART RATE: 111 BPM | OXYGEN SATURATION: 98 % | BODY MASS INDEX: 19.52 KG/M2 | WEIGHT: 157 LBS | RESPIRATION RATE: 14 BRPM | DIASTOLIC BLOOD PRESSURE: 86 MMHG | HEIGHT: 75 IN | SYSTOLIC BLOOD PRESSURE: 128 MMHG | TEMPERATURE: 97.6 F

## 2020-02-18 PROCEDURE — G8484 FLU IMMUNIZE NO ADMIN: HCPCS | Performed by: INTERNAL MEDICINE

## 2020-02-18 PROCEDURE — 99213 OFFICE O/P EST LOW 20 MIN: CPT | Performed by: INTERNAL MEDICINE

## 2020-02-18 PROCEDURE — G8420 CALC BMI NORM PARAMETERS: HCPCS | Performed by: INTERNAL MEDICINE

## 2020-02-18 PROCEDURE — G8427 DOCREV CUR MEDS BY ELIG CLIN: HCPCS | Performed by: INTERNAL MEDICINE

## 2020-02-18 PROCEDURE — 4004F PT TOBACCO SCREEN RCVD TLK: CPT | Performed by: INTERNAL MEDICINE

## 2020-02-18 RX ORDER — HYDROCODONE BITARTRATE AND ACETAMINOPHEN 5; 325 MG/1; MG/1
1 TABLET ORAL 2 TIMES DAILY PRN
Qty: 60 TABLET | Refills: 0 | Status: SHIPPED | OUTPATIENT
Start: 2020-02-18 | End: 2020-03-19

## 2020-02-18 NOTE — PROGRESS NOTES
2020     Remberto Hodges (:  1990) is a 26 y.o. male, here for evaluation of the following medical concerns:  Chief Complaint   Patient presents with    Results     Discuss test results for UDS     Orders     discuss Home Health        HPI  OV to discuss positive UDS. Patient agreeable to stopping marijuana and obtains relief from current pain regimen. Will institute more regular UDS and pill counts. Will not prescribe if UDS is positive. Patient denies recent use, but did smoke in the past.    Controlled Substance Monitoring:    Acute and Chronic Pain Monitoring:   RX Monitoring 2020   Attestation -   Acute Pain Prescriptions -   Periodic Controlled Substance Monitoring Possible medication side effects, risk of tolerance/dependence & alternative treatments discussed. ;Obtaining appropriate analgesic effect of treatment. ;Assessed functional status. ;Potential drug abuse or diversion identified, see note documentation. Chronic Pain > 50 MEDD Re-evaluated the status of the patient's underlying condition causing pain. Patient's medications, allergies, past medical, surgical, social and family histories were reviewed and updated as appropriate. Review of Systems   Constitutional: Positive for appetite change and unexpected weight change (lost with the flu). Prior to Visit Medications    Medication Sig Taking? Authorizing Provider   metroNIDAZOLE (METROGEL) 0.75 % gel Apply topically 2 times daily.   Patient not taking: Reported on 2020  Maria C Samuels MD   leuprolide (LUPRON) 3.75 MG injection Inject 3.75 mg into the muscle every 30 days  Historical Provider, MD   docusate sodium (COLACE) 100 MG capsule Take 1 capsule by mouth 2 times daily as needed for Constipation  Patient not taking: Reported on 2020  Maria C Samuels MD   estradiol (ESTRACE) 2 MG tablet Take 3 mg by mouth daily   Historical Provider, MD   Handicap Placard MISC by Does not apply route  2016- January 7 2021  Buck Ibrahim MD        Social History     Tobacco Use    Smoking status: Current Some Day Smoker     Packs/day: 0.05     Years: 3.00     Pack years: 0.15     Types: Cigarettes    Smokeless tobacco: Never Used    Tobacco comment: less than 1 pack a month   Substance Use Topics    Alcohol use: Yes     Comment: rarely        Vitals:    02/18/20 1549   BP: 128/86   Site: Right Upper Arm   Position: Sitting   Cuff Size: Small Adult   Pulse: 111   Resp: 14   Temp: 97.6 °F (36.4 °C)   TempSrc: Oral   SpO2: 98%   Weight: 157 lb (71.2 kg)   Height: 6' 3\" (1.905 m)     Estimated body mass index is 20 kg/m² as calculated from the following:    Height as of 1/21/20: 6' 3\" (1.905 m). Weight as of 1/21/20: 160 lb (72.6 kg). Physical Exam  Vitals signs reviewed. Constitutional:       General: He is not in acute distress. Appearance: He is well-developed. Eyes:      General: No scleral icterus. Conjunctiva/sclera: Conjunctivae normal.   Neck:      Thyroid: No thyromegaly. Vascular: No JVD. Neurological:      Mental Status: He is alert and oriented to person, place, and time. Cranial Nerves: No cranial nerve deficit. Psychiatric:         Behavior: Behavior normal.         Thought Content: Thought content normal.         Judgment: Judgment normal.         ASSESSMENT/PLAN:  Allegra Cerrato was seen today for results and orders. Diagnoses and all orders for this visit:    Nasra-Danlos disease  -     HYDROcodone-acetaminophen (NORCO) 5-325 MG per tablet; Take 1 tablet by mouth 2 times daily as needed for Pain for up to 30 days. Chronic hip pain, left  -     HYDROcodone-acetaminophen (NORCO) 5-325 MG per tablet; Take 1 tablet by mouth 2 times daily as needed for Pain for up to 30 days. Stable, plan as above        An electronic signature was used to authenticate this note.     --Buck Ibrahim MD on 2/18/2020 at 3:39 PM

## 2020-03-09 PROBLEM — Z48.810 ENCOUNTER FOR SURGICAL AFTERCARE FOLLOWING SURGERY ON THE SENSE ORGANS: Status: ACTIVE | Noted: 2020-03-09

## 2020-03-10 ENCOUNTER — OFFICE VISIT (OUTPATIENT)
Dept: FAMILY MEDICINE CLINIC | Age: 30
End: 2020-03-10
Payer: MEDICAID

## 2020-03-10 VITALS
TEMPERATURE: 98.3 F | BODY MASS INDEX: 20.51 KG/M2 | OXYGEN SATURATION: 99 % | SYSTOLIC BLOOD PRESSURE: 110 MMHG | WEIGHT: 165 LBS | HEART RATE: 101 BPM | HEIGHT: 75 IN | DIASTOLIC BLOOD PRESSURE: 72 MMHG

## 2020-03-10 PROCEDURE — G8427 DOCREV CUR MEDS BY ELIG CLIN: HCPCS | Performed by: INTERNAL MEDICINE

## 2020-03-10 PROCEDURE — 99213 OFFICE O/P EST LOW 20 MIN: CPT | Performed by: INTERNAL MEDICINE

## 2020-03-10 PROCEDURE — G8484 FLU IMMUNIZE NO ADMIN: HCPCS | Performed by: INTERNAL MEDICINE

## 2020-03-10 PROCEDURE — G8420 CALC BMI NORM PARAMETERS: HCPCS | Performed by: INTERNAL MEDICINE

## 2020-03-10 PROCEDURE — 4004F PT TOBACCO SCREEN RCVD TLK: CPT | Performed by: INTERNAL MEDICINE

## 2020-03-10 NOTE — PROGRESS NOTES
Site: Right Upper Arm   Position: Sitting   Cuff Size: Small Adult   Pulse: 101   Temp: 98.3 °F (36.8 °C)   TempSrc: Oral   SpO2: 99%   Weight: 165 lb (74.8 kg)   Height: 6' 3\" (1.905 m)     Estimated body mass index is 20.62 kg/m² as calculated from the following:    Height as of this encounter: 6' 3\" (1.905 m). Weight as of this encounter: 165 lb (74.8 kg). Physical Exam  Constitutional:       Appearance: He is well-developed. Eyes:      General: No scleral icterus. Conjunctiva/sclera: Conjunctivae normal.   Neck:      Musculoskeletal: Normal range of motion and neck supple. Thyroid: No thyromegaly. Vascular: No carotid bruit or JVD. Cardiovascular:      Rate and Rhythm: Normal rate and regular rhythm. Heart sounds: Normal heart sounds. No murmur. Pulmonary:      Effort: Pulmonary effort is normal. No respiratory distress. Breath sounds: Normal breath sounds. No wheezing or rales. Chest:      Chest wall: No tenderness. Abdominal:      General: Bowel sounds are normal. There is no distension. Palpations: Abdomen is soft. There is no mass. Tenderness: There is no abdominal tenderness. There is no guarding or rebound. Musculoskeletal: Normal range of motion. General: No tenderness. Skin:     Findings: No rash. Neurological:      Mental Status: He is alert and oriented to person, place, and time. ASSESSMENT/PLAN:  Zay Barnes was seen today for medication check. Diagnoses and all orders for this visit:    Chronic left hip pain    Need for 23-polyvalent pneumococcal polysaccharide vaccine    Medication management  -     Drug Panel-PM-HI Res-UR Interp-A    Encounter for chronic pain management  -     Drug Panel-PM-HI Res-UR Interp-A    Nasra-Danlos disease    Marijuana use            No follow-ups on file. An electronic signature was used to authenticate this note.     --Nisreen Sheriff MD on 3/10/2020 at 4:45 PM

## 2020-03-12 LAB
6-ACETYLMORPHINE: NOT DETECTED
7-AMINOCLONAZEPAM: NOT DETECTED
ALPHA-OH-ALPRAZOLAM: NOT DETECTED
ALPRAZOLAM: NOT DETECTED
AMPHETAMINE: NOT DETECTED
BARBITURATES: NOT DETECTED
BENZOYLECGONINE: NOT DETECTED
BUPRENORPHINE: NOT DETECTED
CARISOPRODOL: NOT DETECTED
CLONAZEPAM: NOT DETECTED
CODEINE: NOT DETECTED
CREATININE URINE: 154.4 MG/DL (ref 20–400)
DIAZEPAM: NOT DETECTED
DRUGS EXPECTED: NORMAL
EER PAIN MGT DRUG PANEL, HIGH RES/EMIT U: NORMAL
ETHYL GLUCURONIDE: NOT DETECTED
FENTANYL: NOT DETECTED
HYDROCODONE: PRESENT
HYDROMORPHONE: NOT DETECTED
LORAZEPAM: NOT DETECTED
MARIJUANA METABOLITE: PRESENT
MDA: NOT DETECTED
MDEA: NOT DETECTED
MDMA URINE: NOT DETECTED
MEPERIDINE: NOT DETECTED
METHADONE: NOT DETECTED
METHAMPHETAMINE: NOT DETECTED
METHYLPHENIDATE: NOT DETECTED
MIDAZOLAM: NOT DETECTED
MORPHINE: NOT DETECTED
NORBUPRENORPHINE, FREE: NOT DETECTED
NORDIAZEPAM: NOT DETECTED
NORFENTANYL: NOT DETECTED
NORHYDROCODONE, URINE: PRESENT
NOROXYCODONE: NOT DETECTED
NOROXYMORPHONE, URINE: NOT DETECTED
OXAZEPAM: NOT DETECTED
OXYCODONE: NOT DETECTED
OXYMORPHONE: NOT DETECTED
PAIN MANAGEMENT DRUG PANEL: NORMAL
PAIN MANAGEMENT DRUG PANEL: NORMAL
PCP: NOT DETECTED
PHENTERMINE: NOT DETECTED
PROPOXYPHENE: NOT DETECTED
TAPENTADOL, URINE: NOT DETECTED
TAPENTADOL-O-SULFATE, URINE: NOT DETECTED
TEMAZEPAM: NOT DETECTED
TRAMADOL: NOT DETECTED
ZOLPIDEM: NOT DETECTED

## 2020-03-24 ENCOUNTER — TELEPHONE (OUTPATIENT)
Dept: FAMILY MEDICINE CLINIC | Age: 30
End: 2020-03-24

## 2020-03-27 ENCOUNTER — VIRTUAL VISIT (OUTPATIENT)
Dept: FAMILY MEDICINE CLINIC | Age: 30
End: 2020-03-27
Payer: MEDICAID

## 2020-03-27 PROCEDURE — 99213 OFFICE O/P EST LOW 20 MIN: CPT | Performed by: INTERNAL MEDICINE

## 2020-03-27 PROCEDURE — G8428 CUR MEDS NOT DOCUMENT: HCPCS | Performed by: INTERNAL MEDICINE

## 2020-03-27 ASSESSMENT — ENCOUNTER SYMPTOMS: BACK PAIN: 1

## 2020-03-27 NOTE — PROGRESS NOTES
3/27/2020    TELEHEALTH EVALUATION -- Audio/Visual (During HSNEN-09 public health emergency)    HPI:    Chasidy Johnson (:  1990) has requested an audio/video evaluation for the following concern(s):    THC positive in UDS. Reaffirmed I will not prescribe narcotics to him. Offered diclofenac to try for hip pain. Patient's medications, allergies, past medical, surgical, social and family histories were reviewed and updated as appropriate. Review of Systems   Constitutional: Negative for fatigue and fever. Musculoskeletal: Positive for arthralgias, back pain and gait problem. Prior to Visit Medications    Medication Sig Taking? Authorizing Provider   diclofenac sodium (VOLTAREN) 1 % GEL Apply 4 g topically 4 times daily Yes Shawanda Forrest MD   metroNIDAZOLE (METROGEL) 0.75 % gel Apply topically 2 times daily. Patient not taking: Reported on 2020  Shawanda Forrest MD   leuprolide (LUPRON) 3.75 MG injection Inject 3.75 mg into the muscle every 30 days  Historical Provider, MD   docusate sodium (COLACE) 100 MG capsule Take 1 capsule by mouth 2 times daily as needed for Constipation  Patient not taking: Reported on 2020  Shawanda Forrest MD   estradiol (ESTRACE) 2 MG tablet Take 3 mg by mouth daily   Historical Provider, MD   Handicap Placard [address] [place] [address] by Does not apply route 2016- 2021  Shawanda Forrset MD       Social History     Tobacco Use    Smoking status: Current Some Day Smoker     Packs/day: 0.05     Years: 3.00     Pack years: 0.15     Types: Cigarettes    Smokeless tobacco: Never Used    Tobacco comment: less than 1 pack a month   Substance Use Topics    Alcohol use: Yes     Comment: rarely    Drug use:  No            PHYSICAL EXAMINATION:  [ INSTRUCTIONS:  \"[x]\" Indicates a positive item  \"[]\" Indicates a negative item  -- DELETE ALL ITEMS NOT EXAMINED]  Vital Signs: (As obtained by patient/caregiver or practitioner observation)    Blood pressure-  Heart

## 2020-04-10 ASSESSMENT — ENCOUNTER SYMPTOMS
COUGH: 0
SHORTNESS OF BREATH: 0

## 2020-08-31 ENCOUNTER — TELEPHONE (OUTPATIENT)
Dept: FAMILY MEDICINE CLINIC | Age: 30
End: 2020-08-31

## 2020-08-31 NOTE — TELEPHONE ENCOUNTER
Refill Request     Last Seen: 3/27/2020    Last Written: 5/20/20    Next Appointment:   No future appointments. Last seen on 3/20/20 - needed 4 week follow up at that time but pt did not schedule.   Call and LM for pt to call and schedule FU    Left message on the patient's/parent's/guardian's voicemail asking for return call to schedule appointment       Requested Prescriptions     Pending Prescriptions Disp Refills    diclofenac sodium (VOLTAREN) 1 % GEL [Pharmacy Med Name: DICLOFENAC SODIUM 1% GEL] 200 g 4     Sig: APPLY 2 GRAMS TO THE AFFECTED AREA 4 TIMES DAILY

## 2020-10-12 ENCOUNTER — OFFICE VISIT (OUTPATIENT)
Dept: FAMILY MEDICINE CLINIC | Age: 30
End: 2020-10-12
Payer: MEDICAID

## 2020-10-12 VITALS
WEIGHT: 157 LBS | HEART RATE: 126 BPM | TEMPERATURE: 98.1 F | OXYGEN SATURATION: 98 % | BODY MASS INDEX: 19.52 KG/M2 | SYSTOLIC BLOOD PRESSURE: 122 MMHG | DIASTOLIC BLOOD PRESSURE: 84 MMHG | HEIGHT: 75 IN

## 2020-10-12 PROCEDURE — 4004F PT TOBACCO SCREEN RCVD TLK: CPT | Performed by: PHYSICIAN ASSISTANT

## 2020-10-12 PROCEDURE — G8427 DOCREV CUR MEDS BY ELIG CLIN: HCPCS | Performed by: PHYSICIAN ASSISTANT

## 2020-10-12 PROCEDURE — G8484 FLU IMMUNIZE NO ADMIN: HCPCS | Performed by: PHYSICIAN ASSISTANT

## 2020-10-12 PROCEDURE — 99213 OFFICE O/P EST LOW 20 MIN: CPT | Performed by: PHYSICIAN ASSISTANT

## 2020-10-12 PROCEDURE — G8420 CALC BMI NORM PARAMETERS: HCPCS | Performed by: PHYSICIAN ASSISTANT

## 2020-10-12 ASSESSMENT — ENCOUNTER SYMPTOMS
BACK PAIN: 1
SHORTNESS OF BREATH: 0

## 2020-10-12 NOTE — PROGRESS NOTES
10/12/2020     Zora Cueto (:  1990) is a 31 y.o. male, here for evaluation of the following medical concerns:    HPI  Nasra-Danlos and chronic left hip pain: Handicap placard due. Continues to have left hip pain and midline back pain. He is using crutches and diclofenac gel to improve symptoms which is mostly helpful. He denies any changes in symptoms from previous visits. Review of Systems   Constitutional: Positive for fatigue. Negative for unexpected weight change. Respiratory: Negative for shortness of breath. Cardiovascular: Negative for chest pain. Musculoskeletal: Positive for arthralgias, back pain, gait problem and myalgias. Negative for joint swelling. Neurological: Positive for weakness. Negative for numbness. Prior to Visit Medications    Medication Sig Taking? Authorizing Provider   diclofenac sodium (VOLTAREN) 1 % GEL APPLY 2 GRAMS TO THE AFFECTED AREA 4 TIMES DAILY Yes YARY Ortez   Handicap Placard MISC by Does not apply route I have evaluated this patient and Zora Cueto needs handicap placard for 4 years.  Yes YARY Ortez   leuprolide (LUPRON) 3.75 MG injection Inject 3.75 mg into the muscle every 30 days Yes Historical Provider, MD   estradiol (ESTRACE) 2 MG tablet Take 3 mg by mouth daily  Yes Historical Provider, MD   Handicap Placard [address] [facility] by Does not apply route 2016- 2021 Yes Sheron Cook MD        Social History     Tobacco Use    Smoking status: Current Some Day Smoker     Packs/day: 0.05     Years: 3.00     Pack years: 0.15     Types: Cigarettes    Smokeless tobacco: Never Used    Tobacco comment: less than 1 pack a month   Substance Use Topics    Alcohol use: Yes     Comment: rarely        Vitals:    10/12/20 1128   BP: 122/84   Site: Left Upper Arm   Position: Sitting   Cuff Size: Small Adult   Pulse: 126   Temp: 98.1 °F (36.7 °C)   TempSrc: Oral   SpO2: 98%   Weight: 157 lb (71.2 kg)   Height: 6' 3\" (1.905 m)     Estimated body mass index is 19.62 kg/m² as calculated from the following:    Height as of this encounter: 6' 3\" (1.905 m). Weight as of this encounter: 157 lb (71.2 kg). Physical Exam  Vitals signs reviewed. Constitutional:       Appearance: Normal appearance. HENT:      Head: Normocephalic and atraumatic. Eyes:      Pupils: Pupils are equal, round, and reactive to light. Cardiovascular:      Rate and Rhythm: Normal rate and regular rhythm. Heart sounds: Normal heart sounds. Pulmonary:      Effort: Pulmonary effort is normal.      Breath sounds: Normal breath sounds. Neurological:      Mental Status: He is alert and oriented to person, place, and time. Cranial Nerves: No cranial nerve deficit. ASSESSMENT/PLAN:  1. Chronic left hip pain  - diclofenac sodium (VOLTAREN) 1 % GEL; APPLY 2 GRAMS TO THE AFFECTED AREA 4 TIMES DAILY  Dispense: 200 g; Refill: 5    2. Nasra-Danlos syndrome, benign hypermobile form  - Handicap Placard MISC; by Does not apply route I have evaluated this patient and Zora Cueto needs handicap placard for 4 years. Dispense: 1 each; Refill: 0    3. Strain of flexor muscle of left hip, subsequent encounter  - Handicap Placard MISC; by Does not apply route I have evaluated this patient and Zora Cueto needs handicap placard for 4 years. Dispense: 1 each; Refill: 0    4. Scoliosis, unspecified scoliosis type, unspecified spinal region  - Handicap Placard MISC; by Does not apply route I have evaluated this patient and Zora Cueto needs handicap placard for 4 years. Dispense: 1 each; Refill: 0      No follow-ups on file. An electronic signature was used to authenticate this note.     --YARY Ortez on 10/12/2020 at 4:41 PM

## 2020-12-29 ENCOUNTER — TELEPHONE (OUTPATIENT)
Dept: FAMILY MEDICINE CLINIC | Age: 30
End: 2020-12-29

## 2020-12-29 NOTE — TELEPHONE ENCOUNTER
This was ordered at his last visit- can it be reprinted and signed by last provider, otherwise I will

## 2020-12-29 NOTE — TELEPHONE ENCOUNTER
This will need reprinted because it is on prescription paper. Kun Cook you had authorized this last time 10/12/2020 will you please reprint/sign and give to me I will take care of it. Dr Silverio Brown left for the afternoon. I'm unsure when she will be back in office she informed me she will not be back this Thursday and  next week only doing virtual from home.

## 2020-12-30 NOTE — TELEPHONE ENCOUNTER
[phone] (home)  Per Petra Garcia unable to leave message on VM. Please inform pt  to come pickup Rx for handicap placcard.   I placed a

## 2021-03-17 ENCOUNTER — TELEPHONE (OUTPATIENT)
Dept: FAMILY MEDICINE CLINIC | Age: 31
End: 2021-03-17

## 2021-03-17 NOTE — LETTER
[address]  [facility] [address]  Phone: [phone]  Fax: [phone]    Khloeace Bladimir      March 17, 2021     Patient: Kathleen Prieto   YOB: 1990   Date of Visit: 10/12/2020       To Whom it May Concern:    Kathleen Prieto was seen in my clinic on 10/12/2020. He has a chronic disease that causes disability and prevents him from working. If you have any questions or concerns, please don't hesitate to call.     Sincerely,           Muna Rebollar PA-C

## 2021-03-17 NOTE — TELEPHONE ENCOUNTER
Pt requesting letter for [employer] requesting letter for patient. Letter needs to state:     María Posey seen in my clinic on 10- He has a chronic disease that causes disability. Pt. Is unable to work.      Please fax to [phone]

## 2021-03-27 ENCOUNTER — HOSPITAL ENCOUNTER (INPATIENT)
Age: 31
LOS: 1 days | Discharge: HOME | DRG: 254 | End: 2021-03-28
Attending: EMERGENCY MEDICINE | Admitting: SURGERY
Payer: MEDICAID

## 2021-03-27 ENCOUNTER — ANESTHESIA EVENT (OUTPATIENT)
Dept: OPERATING ROOM | Age: 31
DRG: 254 | End: 2021-03-27
Payer: MEDICAID

## 2021-03-27 ENCOUNTER — ANESTHESIA (OUTPATIENT)
Dept: OPERATING ROOM | Age: 31
DRG: 254 | End: 2021-03-27
Payer: MEDICAID

## 2021-03-27 ENCOUNTER — APPOINTMENT (OUTPATIENT)
Dept: CT IMAGING | Age: 31
DRG: 254 | End: 2021-03-27
Payer: MEDICAID

## 2021-03-27 VITALS
SYSTOLIC BLOOD PRESSURE: 122 MMHG | OXYGEN SATURATION: 97 % | RESPIRATION RATE: 21 BRPM | DIASTOLIC BLOOD PRESSURE: 77 MMHG

## 2021-03-27 DIAGNOSIS — T18.5XXA FOREIGN BODY OF RECTUM, INITIAL ENCOUNTER: Primary | ICD-10-CM

## 2021-03-27 LAB
A/G RATIO: 1.6 (ref 1.1–2.2)
ALBUMIN SERPL-MCNC: 4.7 G/DL (ref 3.4–5)
ALP BLD-CCNC: 57 U/L (ref 40–129)
ALT SERPL-CCNC: 10 U/L (ref 10–40)
ANION GAP SERPL CALCULATED.3IONS-SCNC: 12 MMOL/L (ref 3–16)
AST SERPL-CCNC: 14 U/L (ref 15–37)
BASOPHILS ABSOLUTE: 0 K/UL (ref 0–0.2)
BASOPHILS RELATIVE PERCENT: 0.2 %
BILIRUB SERPL-MCNC: 0.9 MG/DL (ref 0–1)
BUN BLDV-MCNC: 20 MG/DL (ref 7–20)
CALCIUM SERPL-MCNC: 9.5 MG/DL (ref 8.3–10.6)
CHLORIDE BLD-SCNC: 101 MMOL/L (ref 99–110)
CO2: 26 MMOL/L (ref 21–32)
CREAT SERPL-MCNC: 0.7 MG/DL (ref 0.9–1.3)
EOSINOPHILS ABSOLUTE: 0 K/UL (ref 0–0.6)
EOSINOPHILS RELATIVE PERCENT: 0 %
GFR AFRICAN AMERICAN: >60
GFR NON-AFRICAN AMERICAN: >60
GLOBULIN: 3 G/DL
GLUCOSE BLD-MCNC: 129 MG/DL (ref 70–99)
HCT VFR BLD CALC: 40.1 % (ref 40.5–52.5)
HEMOGLOBIN: 13.7 G/DL (ref 13.5–17.5)
LYMPHOCYTES ABSOLUTE: 0.6 K/UL (ref 1–5.1)
LYMPHOCYTES RELATIVE PERCENT: 3.3 %
MCH RBC QN AUTO: 29.1 PG (ref 26–34)
MCHC RBC AUTO-ENTMCNC: 34.3 G/DL (ref 31–36)
MCV RBC AUTO: 84.8 FL (ref 80–100)
MONOCYTES ABSOLUTE: 1.3 K/UL (ref 0–1.3)
MONOCYTES RELATIVE PERCENT: 6.6 %
NEUTROPHILS ABSOLUTE: 17.8 K/UL (ref 1.7–7.7)
NEUTROPHILS RELATIVE PERCENT: 89.9 %
PDW BLD-RTO: 13.6 % (ref 12.4–15.4)
PLATELET # BLD: 203 K/UL (ref 135–450)
PMV BLD AUTO: 7.8 FL (ref 5–10.5)
POTASSIUM SERPL-SCNC: 3.8 MMOL/L (ref 3.5–5.1)
RBC # BLD: 4.72 M/UL (ref 4.2–5.9)
SODIUM BLD-SCNC: 139 MMOL/L (ref 136–145)
SPECIMEN STATUS: NORMAL
TOTAL PROTEIN: 7.7 G/DL (ref 6.4–8.2)
WBC # BLD: 19.8 K/UL (ref 4–11)

## 2021-03-27 PROCEDURE — 45915 REMOVE RECTAL OBSTRUCTION: CPT | Performed by: SURGERY

## 2021-03-27 PROCEDURE — 2709999900 HC NON-CHARGEABLE SUPPLY: Performed by: SURGERY

## 2021-03-27 PROCEDURE — 3700000000 HC ANESTHESIA ATTENDED CARE: Performed by: SURGERY

## 2021-03-27 PROCEDURE — 3700000001 HC ADD 15 MINUTES (ANESTHESIA): Performed by: SURGERY

## 2021-03-27 PROCEDURE — 3600000004 HC SURGERY LEVEL 4 BASE: Performed by: SURGERY

## 2021-03-27 PROCEDURE — 2580000003 HC RX 258: Performed by: ANESTHESIOLOGY

## 2021-03-27 PROCEDURE — G0378 HOSPITAL OBSERVATION PER HR: HCPCS

## 2021-03-27 PROCEDURE — 3600000014 HC SURGERY LEVEL 4 ADDTL 15MIN: Performed by: SURGERY

## 2021-03-27 PROCEDURE — 2500000003 HC RX 250 WO HCPCS: Performed by: ANESTHESIOLOGY

## 2021-03-27 PROCEDURE — 0DCP7ZZ EXTIRPATION OF MATTER FROM RECTUM, VIA NATURAL OR ARTIFICIAL OPENING: ICD-10-PCS | Performed by: SURGERY

## 2021-03-27 PROCEDURE — 99283 EMERGENCY DEPT VISIT LOW MDM: CPT

## 2021-03-27 PROCEDURE — 7100000001 HC PACU RECOVERY - ADDTL 15 MIN: Performed by: SURGERY

## 2021-03-27 PROCEDURE — 99223 1ST HOSP IP/OBS HIGH 75: CPT | Performed by: SURGERY

## 2021-03-27 PROCEDURE — 80053 COMPREHEN METABOLIC PANEL: CPT

## 2021-03-27 PROCEDURE — 2580000003 HC RX 258: Performed by: SURGERY

## 2021-03-27 PROCEDURE — 6360000002 HC RX W HCPCS: Performed by: ANESTHESIOLOGY

## 2021-03-27 PROCEDURE — 85025 COMPLETE CBC W/AUTO DIFF WBC: CPT

## 2021-03-27 PROCEDURE — 7100000000 HC PACU RECOVERY - FIRST 15 MIN: Performed by: SURGERY

## 2021-03-27 PROCEDURE — 1200000000 HC SEMI PRIVATE

## 2021-03-27 PROCEDURE — 74176 CT ABD & PELVIS W/O CONTRAST: CPT

## 2021-03-27 RX ORDER — OXYCODONE HYDROCHLORIDE AND ACETAMINOPHEN 5; 325 MG/1; MG/1
1 TABLET ORAL PRN
Status: DISCONTINUED | OUTPATIENT
Start: 2021-03-27 | End: 2021-03-27

## 2021-03-27 RX ORDER — PROMETHAZINE HYDROCHLORIDE 25 MG/1
12.5 TABLET ORAL EVERY 6 HOURS PRN
Status: DISCONTINUED | OUTPATIENT
Start: 2021-03-27 | End: 2021-03-28 | Stop reason: HOSPADM

## 2021-03-27 RX ORDER — SUCCINYLCHOLINE CHLORIDE 20 MG/ML
INJECTION INTRAMUSCULAR; INTRAVENOUS PRN
Status: DISCONTINUED | OUTPATIENT
Start: 2021-03-27 | End: 2021-03-27 | Stop reason: SDUPTHER

## 2021-03-27 RX ORDER — SODIUM CHLORIDE 0.9 % (FLUSH) 0.9 %
10 SYRINGE (ML) INJECTION EVERY 12 HOURS SCHEDULED
Status: DISCONTINUED | OUTPATIENT
Start: 2021-03-27 | End: 2021-03-28 | Stop reason: HOSPADM

## 2021-03-27 RX ORDER — MEPERIDINE HYDROCHLORIDE 50 MG/ML
12.5 INJECTION INTRAMUSCULAR; INTRAVENOUS; SUBCUTANEOUS EVERY 5 MIN PRN
Status: DISCONTINUED | OUTPATIENT
Start: 2021-03-27 | End: 2021-03-27

## 2021-03-27 RX ORDER — DIPHENHYDRAMINE HYDROCHLORIDE 50 MG/ML
12.5 INJECTION INTRAMUSCULAR; INTRAVENOUS
Status: DISCONTINUED | OUTPATIENT
Start: 2021-03-27 | End: 2021-03-27

## 2021-03-27 RX ORDER — ACETAMINOPHEN 325 MG/1
650 TABLET ORAL EVERY 6 HOURS PRN
Status: DISCONTINUED | OUTPATIENT
Start: 2021-03-27 | End: 2021-03-28 | Stop reason: HOSPADM

## 2021-03-27 RX ORDER — SODIUM CHLORIDE 9 MG/ML
INJECTION, SOLUTION INTRAVENOUS CONTINUOUS PRN
Status: DISCONTINUED | OUTPATIENT
Start: 2021-03-27 | End: 2021-03-27 | Stop reason: SDUPTHER

## 2021-03-27 RX ORDER — MORPHINE SULFATE 2 MG/ML
1 INJECTION, SOLUTION INTRAMUSCULAR; INTRAVENOUS EVERY 5 MIN PRN
Status: DISCONTINUED | OUTPATIENT
Start: 2021-03-27 | End: 2021-03-27

## 2021-03-27 RX ORDER — OXYCODONE HYDROCHLORIDE AND ACETAMINOPHEN 5; 325 MG/1; MG/1
2 TABLET ORAL PRN
Status: DISCONTINUED | OUTPATIENT
Start: 2021-03-27 | End: 2021-03-27

## 2021-03-27 RX ORDER — ONDANSETRON 2 MG/ML
4 INJECTION INTRAMUSCULAR; INTRAVENOUS EVERY 6 HOURS PRN
Status: DISCONTINUED | OUTPATIENT
Start: 2021-03-27 | End: 2021-03-28 | Stop reason: HOSPADM

## 2021-03-27 RX ORDER — MORPHINE SULFATE 2 MG/ML
2 INJECTION, SOLUTION INTRAMUSCULAR; INTRAVENOUS EVERY 5 MIN PRN
Status: DISCONTINUED | OUTPATIENT
Start: 2021-03-27 | End: 2021-03-27

## 2021-03-27 RX ORDER — HYDRALAZINE HYDROCHLORIDE 20 MG/ML
5 INJECTION INTRAMUSCULAR; INTRAVENOUS EVERY 10 MIN PRN
Status: DISCONTINUED | OUTPATIENT
Start: 2021-03-27 | End: 2021-03-27

## 2021-03-27 RX ORDER — PROPOFOL 10 MG/ML
INJECTION, EMULSION INTRAVENOUS PRN
Status: DISCONTINUED | OUTPATIENT
Start: 2021-03-27 | End: 2021-03-27 | Stop reason: SDUPTHER

## 2021-03-27 RX ORDER — OXYCODONE HYDROCHLORIDE 5 MG/1
10 TABLET ORAL EVERY 4 HOURS PRN
Status: DISCONTINUED | OUTPATIENT
Start: 2021-03-27 | End: 2021-03-28 | Stop reason: HOSPADM

## 2021-03-27 RX ORDER — SODIUM CHLORIDE 0.9 % (FLUSH) 0.9 %
10 SYRINGE (ML) INJECTION PRN
Status: DISCONTINUED | OUTPATIENT
Start: 2021-03-27 | End: 2021-03-28 | Stop reason: HOSPADM

## 2021-03-27 RX ORDER — ONDANSETRON 2 MG/ML
4 INJECTION INTRAMUSCULAR; INTRAVENOUS PRN
Status: DISCONTINUED | OUTPATIENT
Start: 2021-03-27 | End: 2021-03-27

## 2021-03-27 RX ORDER — SODIUM CHLORIDE 9 MG/ML
INJECTION, SOLUTION INTRAVENOUS CONTINUOUS
Status: DISCONTINUED | OUTPATIENT
Start: 2021-03-27 | End: 2021-03-28 | Stop reason: HOSPADM

## 2021-03-27 RX ORDER — HYDROMORPHONE HCL 110MG/55ML
0.5 PATIENT CONTROLLED ANALGESIA SYRINGE INTRAVENOUS
Status: DISCONTINUED | OUTPATIENT
Start: 2021-03-27 | End: 2021-03-28 | Stop reason: HOSPADM

## 2021-03-27 RX ORDER — LIDOCAINE HYDROCHLORIDE 20 MG/ML
INJECTION, SOLUTION INFILTRATION; PERINEURAL PRN
Status: DISCONTINUED | OUTPATIENT
Start: 2021-03-27 | End: 2021-03-27 | Stop reason: SDUPTHER

## 2021-03-27 RX ORDER — ESTRADIOL 1 MG/1
3 TABLET ORAL DAILY
Status: DISCONTINUED | OUTPATIENT
Start: 2021-03-28 | End: 2021-03-28 | Stop reason: HOSPADM

## 2021-03-27 RX ORDER — DOCUSATE SODIUM 100 MG/1
100 CAPSULE, LIQUID FILLED ORAL DAILY
Status: DISCONTINUED | OUTPATIENT
Start: 2021-03-28 | End: 2021-03-28 | Stop reason: HOSPADM

## 2021-03-27 RX ORDER — PROMETHAZINE HYDROCHLORIDE 25 MG/ML
6.25 INJECTION, SOLUTION INTRAMUSCULAR; INTRAVENOUS
Status: DISCONTINUED | OUTPATIENT
Start: 2021-03-27 | End: 2021-03-27

## 2021-03-27 RX ORDER — OXYCODONE HYDROCHLORIDE 5 MG/1
5 TABLET ORAL EVERY 4 HOURS PRN
Status: DISCONTINUED | OUTPATIENT
Start: 2021-03-27 | End: 2021-03-28 | Stop reason: HOSPADM

## 2021-03-27 RX ORDER — LABETALOL HYDROCHLORIDE 5 MG/ML
5 INJECTION, SOLUTION INTRAVENOUS EVERY 10 MIN PRN
Status: DISCONTINUED | OUTPATIENT
Start: 2021-03-27 | End: 2021-03-27

## 2021-03-27 RX ADMIN — LIDOCAINE HYDROCHLORIDE 60 MG: 20 INJECTION, SOLUTION INFILTRATION; PERINEURAL at 21:55

## 2021-03-27 RX ADMIN — SODIUM CHLORIDE: 9 INJECTION, SOLUTION INTRAVENOUS at 21:51

## 2021-03-27 RX ADMIN — PROPOFOL 160 MG: 10 INJECTION, EMULSION INTRAVENOUS at 21:55

## 2021-03-27 RX ADMIN — SUCCINYLCHOLINE CHLORIDE 120 MG: 20 INJECTION, SOLUTION INTRAMUSCULAR; INTRAVENOUS at 21:55

## 2021-03-27 RX ADMIN — SODIUM CHLORIDE: 9 INJECTION, SOLUTION INTRAVENOUS at 23:26

## 2021-03-27 ASSESSMENT — PULMONARY FUNCTION TESTS
PIF_VALUE: 3
PIF_VALUE: 1
PIF_VALUE: 5
PIF_VALUE: 0

## 2021-03-27 ASSESSMENT — PAIN SCALES - GENERAL
PAINLEVEL_OUTOF10: 4
PAINLEVEL_OUTOF10: 3
PAINLEVEL_OUTOF10: 4

## 2021-03-27 ASSESSMENT — PAIN DESCRIPTION - ORIENTATION: ORIENTATION: INNER

## 2021-03-27 ASSESSMENT — PAIN DESCRIPTION - LOCATION
LOCATION: RECTUM

## 2021-03-27 ASSESSMENT — PAIN DESCRIPTION - FREQUENCY: FREQUENCY: CONTINUOUS

## 2021-03-27 ASSESSMENT — PAIN DESCRIPTION - PAIN TYPE: TYPE: ACUTE PAIN

## 2021-03-28 VITALS
WEIGHT: 160 LBS | OXYGEN SATURATION: 97 % | HEIGHT: 74 IN | BODY MASS INDEX: 20.53 KG/M2 | HEART RATE: 97 BPM | DIASTOLIC BLOOD PRESSURE: 68 MMHG | TEMPERATURE: 97.9 F | RESPIRATION RATE: 17 BRPM | SYSTOLIC BLOOD PRESSURE: 127 MMHG

## 2021-03-28 LAB
HCT VFR BLD CALC: 37.8 % (ref 40.5–52.5)
HEMOGLOBIN: 12.7 G/DL (ref 13.5–17.5)
MCH RBC QN AUTO: 28.6 PG (ref 26–34)
MCHC RBC AUTO-ENTMCNC: 33.7 G/DL (ref 31–36)
MCV RBC AUTO: 85.1 FL (ref 80–100)
PDW BLD-RTO: 13.6 % (ref 12.4–15.4)
PLATELET # BLD: 204 K/UL (ref 135–450)
PMV BLD AUTO: 7.5 FL (ref 5–10.5)
RBC # BLD: 4.45 M/UL (ref 4.2–5.9)
WBC # BLD: 15.3 K/UL (ref 4–11)

## 2021-03-28 PROCEDURE — 2580000003 HC RX 258: Performed by: SURGERY

## 2021-03-28 PROCEDURE — G0378 HOSPITAL OBSERVATION PER HR: HCPCS

## 2021-03-28 PROCEDURE — 6370000000 HC RX 637 (ALT 250 FOR IP): Performed by: SURGERY

## 2021-03-28 PROCEDURE — 85027 COMPLETE CBC AUTOMATED: CPT

## 2021-03-28 RX ADMIN — Medication 10 ML: at 09:00

## 2021-03-28 RX ADMIN — ESTRADIOL 3 MG: 1 TABLET ORAL at 08:59

## 2021-03-28 NOTE — H&P
Department of General Surgery - Adult   History and Physical      PATIENT NAME: Malena Brewer   YOB: 1990    ADMISSION DATE: 3/27/2021  7:49 PM      TODAY'S DATE: 3/27/2021    CHIEF COMPLAINT:  Rectal pain      HISTORY OF PRESENT ILLNESS:  The patient is a 31 y.o. male  who presents with rectal pain. Reports inserting foreign body about two hours prior to presentation. Some mild pelvic pain and rectal pain. No bleeding noticed. No nausea. Past Medical History:        Diagnosis Date    Anxiety     Luis Stain    Chronic back pain     scoliosis    Nasra-Danlos disease     Gender dysphoria     Major depressive disorder, recurrent episode, moderate (HCC)     Dr. Lin Mason fever     Scoliosis        Past Surgical History:        Procedure Laterality Date    WISDOM TOOTH EXTRACTION      2008       Medications Prior to Admission:   Prior to Admission medications    Medication Sig Start Date End Date Taking? Authorizing Provider   Handicap Placard MISC by Does not apply route I have evaluated this patient and Malena Brewer needs handicap placard for 4 years.  12/30/20   YARY Marks   diclofenac sodium (VOLTAREN) 1 % GEL APPLY 2 GRAMS TO THE AFFECTED AREA 4 TIMES DAILY 10/12/20   YARY Marks   leuprolide (LUPRON) 3.75 MG injection Inject 3.75 mg into the muscle every 30 days 5/7/18   Historical Provider, MD   estradiol (ESTRACE) 2 MG tablet Take 3 mg by mouth daily     Historical Provider, MD       Allergies:  Coconut flavor    Social History:   TOBACCO:  yes  ETOH:  yes    Family History:       Problem Relation Age of Onset    Cancer Maternal Grandmother         skin    Diabetes Maternal Grandfather     Stroke Maternal Grandfather        REVIEW OF SYSTEMS:    CONSTITUTIONAL:  negative  HEENT:  Negative  RESPIRATORY:  negative  CARDIOVASCULAR:  negative  GASTROINTESTINAL:  positive for abdominal pain  GENITOURINARY:  negative  HEMATOLOGIC/LYMPHATIC: Vanesa Carter is expected to be hospitalized for >2 days based on the above assessment and plan.       Electronically signed by Shawn Rios, [address] [place] [address]  [phone]

## 2021-03-28 NOTE — PROGRESS NOTES
Patient assessment complete and documented. VSS. A&O x4. Patient arrived from the PACU to room 341. Patient has been oriented to room and to use of call light system. Currently patient is in bed with complaints noted at this time. Bed is in lowest locked position with call light within reach.  Will continue to monitor situation

## 2021-03-28 NOTE — PROGRESS NOTES
Patient with minimal pain. Labs stable and improved WBC. Wants to be discharged so he can go to work at noon. Unfortunately I am at another hospital and will not be able to see him before then. As he is doing well will discharge home.     Samantha Wyatt MD

## 2021-03-28 NOTE — ED PROVIDER NOTES
EMERGENCY DEPARTMENT ENCOUNTER      This patient was seen and evaluated by the attending physician. Pt Name: Neil Monge  MRN: [id number]  [place] 1990  Date of evaluation: 3/27/2021  Provider: BENNIE Franco - CNP-C  PCP: Terri Rosen MD  ED Attending: Nany Domínguez MD    History provided by the patient. CHIEF COMPLAINT:     Chief Complaint   Patient presents with    Rectal Pain     pt states an approximately 6-8\" long plastic dildo is stuck in his rectum - there for approx 2 hours. HISTORY OF PRESENT ILLNESS:      Neil Monge is a 31 y.o. male who presents [facility]  ED with complaints of rectal pain. Patient states that he was using an 8 inch plastic Gildo when it got stuck in his rectum. Patient complaining of rectal pain, states that this happened 2 hours prior to arrival.  Has not happened in the past.  He is here for further evaluation. Karyn Fleet  Severity:7  Duration:2 hours pta  Modifying factors:none noted    Nursing Notes were reviewed     REVIEW OF SYSTEMS:     Review of Systems  All systems, atotal of 10, are reviewed and negative except for those that were just noted in history present illness.         PAST MEDICAL HISTORY:     Past Medical History:   Diagnosis Date    Anxiety     Donthanh Diallo    Chronic back pain     scoliosis    Nasra-Danlos disease     Gender dysphoria     Major depressive disorder, recurrent episode, moderate ([facility] [address]     Dr. Tello Allen    Scarlet fever     Scoliosis          SURGICAL HISTORY:      Past Surgical History:   Procedure Laterality Date    WISDOM TOOTH EXTRACTION      2008         CURRENT MEDICATIONS:       Previous Medications    DICLOFENAC SODIUM (VOLTAREN) 1 % GEL    APPLY 2 GRAMS TO THE AFFECTED AREA 4 TIMES DAILY    ESTRADIOL (ESTRACE) 2 MG TABLET    Take 3 mg by mouth daily     HANDICAP PLACARD MISC    by Does not apply route I have evaluated this patient and Coral [employer] needs handicap placard for 4 years.     LEUPROLIDE (LUPRON) 3.75 MG INJECTION    Inject 3.75 mg into the muscle every 30 days         ALLERGIES:    Coconut flavor    FAMILY HISTORY:       Family History   Problem Relation Age of Onset    Cancer Maternal Grandmother         skin    Diabetes Maternal Grandfather     Stroke Maternal Grandfather           SOCIAL HISTORY:       Social History     Socioeconomic History    Marital status: Single     Spouse name: None    Number of children: None    Years of education: None    Highest education level: None   Occupational History    None   Social Needs    Financial resource strain: None    Food insecurity     Worry: None     Inability: None    Transportation needs     Medical: None     Non-medical: None   Tobacco Use    Smoking status: Current Some Day Smoker     Packs/day: 0.05     Years: 3.00     Pack years: 0.15     Types: Cigarettes    Smokeless tobacco: Never Used    Tobacco comment: less than 1 pack a month   Substance and Sexual Activity    Alcohol use: Yes     Comment: rarely    Drug use: No    Sexual activity: None   Lifestyle    Physical activity     Days per week: None     Minutes per session: None    Stress: None   Relationships    Social connections     Talks on phone: None     Gets together: None     Attends Congregational service: None     Active member of club or organization: None     Attends meetings of clubs or organizations: None     Relationship status: None    Intimate partner violence     Fear of current or ex partner: None     Emotionally abused: None     Physically abused: None     Forced sexual activity: None   Other Topics Concern    None   Social History Narrative    None       SCREENINGS:            PHYSICAL EXAM:       ED Triage Vitals [03/27/21 1952]   BP Temp Temp Source Pulse Resp SpO2 Height Weight   (!) 169/88 98.4 °F (36.9 °C) Oral 138 22 95 % 6' 2\" (1.88 m) 160 lb (72.6 kg)       Physical Exam    CONSTITUTIONAL: Awake and alert. Cooperative. Well-developed. Well-nourished. Vitals:    03/27/21 1952 03/27/21 2130   BP: (!) 169/88 (!) 166/88   Pulse: 138 104   Resp: 22 18   Temp: 98.4 °F (36.9 °C) 98 °F (36.7 °C)   TempSrc: Oral Oral   SpO2: 95% 96%   Weight: 160 lb (72.6 kg)    Height: 6' 2\" (1.88 m)      HENT: Normocephalic. Atraumatic. External ears normal, without discharge. TMs clear bilaterally. No nasal discharge. Oropharynx clear, no erythema. Mucous membranes moist.  EYES: Conjunctiva non-injected, no lid abnormalities noted. No scleral icterus. PERRL. EOM's grossly intact. Anterior chambers clear. NECK: Supple. Normal ROM. No meningismus. No thyroid tenderness or swelling noted. CARDIOVASCULAR: RRR. No Murmer. PULMONARY/CHEST WALL: Effort normal. No tachypnea. Lungs clear to ausculation. ABDOMEN: Normal BS. Soft. Nondistended. No tenderness to palpate. No guarding. No hernias noted. No splenomegaly. Back: Spine is midline. No ecchymosis. No crepitus on palpation. No obvious subluxation of vertebral column. No saddle anesthesia or evidence of cauda equina. /ANORECTAL: no fb protruding from the rectum  MUSKULOSKELETAL: Normal ROM. No acute deformities. No edema. No tenderness to palpate. SKIN: Warm and dry. NEUROLOGICAL:  GCS 15. CN II-XII grossly intact. Strength is 5/5 in allextremities and sensation is intact. PSYCHIATRIC: Normal affect, normal insight and judgement. Alert andoriented x 3.         DIAGNOSTIC RESULTS:     LABS:    Results for orders placed or performed during the hospital encounter of 03/27/21   CBC auto differential   Result Value Ref Range    WBC 19.8 (H) 4.0 - 11.0 K/uL    RBC 4.72 4.20 - 5.90 M/uL    Hemoglobin 13.7 13.5 - 17.5 g/dL    Hematocrit 40.1 (L) 40.5 - 52.5 %    MCV 84.8 80.0 - 100.0 fL    MCH 29.1 26.0 - 34.0 pg    MCHC 34.3 31.0 - 36.0 g/dL    RDW 13.6 12.4 - 15.4 %    Platelets 327 403 - 576 K/uL    MPV 7.8 5.0 - 10.5 fL    Neutrophils % 89.9 %    Lymphocytes % 3.3 % Monocytes % 6.6 %    Eosinophils % 0.0 %    Basophils % 0.2 %    Neutrophils Absolute 17.8 (H) 1.7 - 7.7 K/uL    Lymphocytes Absolute 0.6 (L) 1.0 - 5.1 K/uL    Monocytes Absolute 1.3 0.0 - 1.3 K/uL    Eosinophils Absolute 0.0 0.0 - 0.6 K/uL    Basophils Absolute 0.0 0.0 - 0.2 K/uL   Comprehensive metabolic panel   Result Value Ref Range    Sodium 139 136 - 145 mmol/L    Potassium 3.8 3.5 - 5.1 mmol/L    Chloride 101 99 - 110 mmol/L    CO2 26 21 - 32 mmol/L    Anion Gap 12 3 - 16    Glucose 129 (H) 70 - 99 mg/dL    BUN 20 7 - 20 mg/dL    CREATININE 0.7 (L) 0.9 - 1.3 mg/dL    GFR Non-African American >60 >60    GFR African American >60 >60    Calcium 9.5 8.3 - 10.6 mg/dL    Total Protein 7.7 6.4 - 8.2 g/dL    Albumin 4.7 3.4 - 5.0 g/dL    Albumin/Globulin Ratio 1.6 1.1 - 2.2    Total Bilirubin 0.9 0.0 - 1.0 mg/dL    Alkaline Phosphatase 57 40 - 129 U/L    ALT 10 10 - 40 U/L    AST 14 (L) 15 - 37 U/L    Globulin 3.0 g/dL   Sample possible blood bank testing   Result Value Ref Range    Specimen Status SPENCER          RADIOLOGY:  All x-ray studies are viewed/reviewedby me. Formal interpretations per the radiologist are as follows:      CT ABDOMEN PELVIS WO CONTRAST Additional Contrast? None   Final Result   Foreign body within the rectum and sigmoid. No evidence of perforation or   other acute findings.                  EKG:  See EKG interpretation by an attending phsyician      PROCEDURES:   N/A    CRITICAL CARE TIME:   N/A    CONSULTS:  IP CONSULT TO GENERAL SURGERY      EMERGENCYDEPARTMENT COURSE and DIFFERENTIAL DIAGNOSIS/MDM:   Vitals:    Vitals:    03/27/21 1952 03/27/21 2130   BP: (!) 169/88 (!) 166/88   Pulse: 138 104   Resp: 22 18   Temp: 98.4 °F (36.9 °C) 98 °F (36.7 °C)   TempSrc: Oral Oral   SpO2: 95% 96%   Weight: 160 lb (72.6 kg)    Height: 6' 2\" (1.88 m)        Patient was given the following medications:  Medications - No data to display      Patient was evaluated by both myself and Shabana Whiteside MD. Patient presented to the emergency room today with complaints of a rectal foreign body. Patient CT scan did show a foreign body lodged in the rectum. There is no perforation. I consulted general surgery and spoke with Dr. Azalia Mcgregor, he did agree to come evaluate the patient and take patient to surgery to remove foreign body. Patient was evaluated by the attending physician who agrees with this plan. Patient admitted to the hospital.    Patient laboratory studies, radiographic imaging, andassessment were all discussed with the patient and/or patient family. There was shared decision-making between myself, the attending physician, as well as the patient and/or their surrogate and we are all in agreement with admission. There was an opportunity for questions and all questions were answered to the best of my ability and to the satisfaction of the patient and/or patient family. FINAL IMPRESSION:      1. Foreign body of rectum, initial encounter          DISPOSITION/PLAN:   DISPOSITIONDecision To Admit      PATIENT REFERRED TO:  No follow-up provider specified.     DISCHARGE MEDICATIONS:  New Prescriptions    No medications on file                  (Please note that portions of this note were completed with a voice recognition program.  Efforts were made to edit the dictations, but occasionally words are mis-transcribed.)    BENNIE Simmons CNP-C (electronically signed)        BENNIE Simmons CNP  03/27/21 3775

## 2021-03-28 NOTE — ED PROVIDER NOTES
I independently performed a history and physical on Jean Fuchs. All diagnostic, treatment, and disposition decisions were made by myself in conjunction with the advanced practice provider. For further details of [facility] emergency department encounter, please see Susan Stone NP's documentation. Patient complains of foreign body in the rectum. Complains of rectal pain. On exam abdomen is tender. CT shows rectal foreign body with no perforation. Surgery consulted for removal.     Ct Abdomen Pelvis Wo Contrast Additional Contrast? None    Result Date: 3/27/2021  EXAMINATION: CT OF THE ABDOMEN AND PELVIS WITHOUT CONTRAST 3/27/2021 8:15 pm TECHNIQUE: CT of the abdomen and pelvis was performed without the administration of intravenous contrast. Multiplanar reformatted images are provided for review. Dose modulation, iterative reconstruction, and/or weight based adjustment of the mA/kV was utilized to reduce the radiation dose to as low as reasonably achievable. COMPARISON: None. HISTORY: ORDERING SYSTEM PROVIDED HISTORY: rectal foreign body TECHNOLOGIST PROVIDED HISTORY: Reason for exam:->rectal foreign body Additional Contrast?->None Decision Support Exception->Emergency Medical Condition (MA) Reason for Exam: foreign body in the rectum. pain Acuity: Acute Type of Exam: Initial FINDINGS: Lower Chest: Negative. Clear lung bases Organs: No acute abnormality of the organs of the abdomen. No evidence of pancreatitis. No ureteral stone or hydronephrosis. No evidence of pyelonephritis. GI/Bowel: Foreign body extending from the rectum into the sigmoid. No evidence of perforation. No evidence of colitis or proctitis. No surrounding inflammation. Pelvis: No acute abnormality of the pelvis. No evidence of cystitis. Peritoneum/Retroperitoneum: No free air, ascites or abscess. Bones/Soft Tissues: No fracture or other acute osseous process. Foreign body within the rectum and sigmoid.   No evidence of perforation or other acute findings.         Navin Dcikinson MD  03/28/21 1062

## 2021-03-28 NOTE — ED NOTES
PS General Surgery @ 2033  RE: rectal foreign body per Audra Dancer, NP Dr. Lynett Laity called back @ 2039       Meti Lemu  03/27/21 2047

## 2021-03-28 NOTE — PROGRESS NOTES
Pt brought to PACU. Report obtained from OR RN and anesthesia. Pt placed on monitor SR and remains on RA.

## 2021-03-28 NOTE — PROGRESS NOTES
Patient in bed awake. A/O x 4. Assessment completed and charted. VSS. Respirations even and unlabored. Tolerating clear liquids. States pain 2/10 but tolerable. Bed in lowest position and locked. Call light in reach.

## 2021-03-28 NOTE — ANESTHESIA POSTPROCEDURE EVALUATION
Department of Anesthesiology  Postprocedure Note    Patient: Brielle Serrano  MRN: [id number]  YOB: 1990  Date of evaluation: 3/27/2021  Time:  10:15 PM     Procedure Summary     Date: 03/27/21 Room / Location: [place] [facility] 06 / [facility]    Anesthesia Start: 2151 Anesthesia Stop: 2215    Procedure: RECTAL PERIRECTAL INCISION AND DRAINAGE (N/A ) Diagnosis: (RECTAL FOREIGN BODY)    Surgeons: Codey Figueroa MD Responsible Provider: Jose Ochoa MD    Anesthesia Type: general ASA Status: 2          Anesthesia Type: general    Kunal Phase I:      Kunal Phase II:      Last vitals: Reviewed and per EMR flowsheets.        Anesthesia Post Evaluation    Patient location during evaluation: PACU  Patient participation: complete - patient participated  Level of consciousness: awake and alert  Pain score: 0  Airway patency: patent  Nausea & Vomiting: no nausea and no vomiting  Complications: no  Cardiovascular status: blood pressure returned to baseline  Respiratory status: acceptable  Hydration status: stable

## 2021-03-28 NOTE — ANESTHESIA PRE PROCEDURE
Department of Anesthesiology  Preprocedure Note       Name:  Brielle Serrano   Age:  31 y.o.  :  1990                                          MRN:  [id number]         Date:  3/27/2021      Surgeon: Olga Yeh):  Codey Figueroa MD    Procedure: Procedure(s):  RECTAL PERIRECTAL INCISION AND DRAINAGE    Medications prior to admission:   Prior to Admission medications    Medication Sig Start Date End Date Taking? Authorizing Provider   Handicap Placard MISC by Does not apply route I have evaluated this patient and Brielle Serrano needs handicap placard for 4 years. 20   YARY Nuno   diclofenac sodium (VOLTAREN) 1 % GEL APPLY 2 GRAMS TO THE AFFECTED AREA 4 TIMES DAILY 10/12/20   YARY Nuno   leuprolide (LUPRON) 3.75 MG injection Inject 3.75 mg into the muscle every 30 days 18   Historical Provider, MD   estradiol (ESTRACE) 2 MG tablet Take 3 mg by mouth daily     Historical Provider, MD       Current medications:    No current facility-administered medications for this encounter. Current Outpatient Medications   Medication Sig Dispense Refill    Handicap Placard MISC by Does not apply route I have evaluated this patient and Brielle Serrano needs handicap placard for 4 years. 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 CBC:   Lab Results   Component Value Date    WBC 19.8 03/27/2021    RBC 4.72 03/27/2021    HGB 13.7 03/27/2021    HCT 40.1 03/27/2021    MCV 84.8 03/27/2021    RDW 13.6 03/27/2021     03/27/2021       CMP:   Lab Results   Component Value Date     03/27/2021    K 3.8 03/27/2021     03/27/2021    CO2 26 03/27/2021    BUN 20 03/27/2021    CREATININE 0.7 03/27/2021    GFRAA >60 03/27/2021    AGRATIO 1.6 03/27/2021    LABGLOM >60 03/27/2021    GLUCOSE 129 03/27/2021    PROT 7.7 03/27/2021    CALCIUM 9.5 03/27/2021    BILITOT 0.9 03/27/2021    ALKPHOS 57 03/27/2021    AST 14 03/27/2021    ALT 10 03/27/2021       POC Tests: No results for input(s): POCGLU, POCNA, POCK, POCCL, POCBUN, POCHEMO, POCHCT in the last 72 hours.     Coags: No results found for: PROTIME, INR, APTT    HCG (If Applicable): No results found for: PREGTESTUR, PREGSERUM, HCG, HCGQUANT     ABGs: No results found for: PHART, PO2ART, MWY2HLG, INM9LMV, BEART, Z3PSIURB     Type & Screen (If Applicable):  No results found for: LABABO, LABRH    Drug/Infectious Status (If Applicable):  No results found for: HIV, HEPCAB    COVID-19 Screening (If Applicable): No results found for: COVID19        Anesthesia Evaluation  Patient summary reviewed and Nursing notes reviewed  Airway: Mallampati: I  TM distance: >3 FB   Neck ROM: full  Mouth opening: > = 3 FB Dental: normal exam         Pulmonary:Negative Pulmonary ROS and normal exam  breath sounds clear to auscultation                             Cardiovascular:Negative CV ROS            Rhythm: regular  Rate: normal                    Neuro/Psych:   (+) psychiatric history: GI/Hepatic/Renal: Neg GI/Hepatic/Renal ROS            Endo/Other: Negative Endo/Other ROS                    Abdominal:           Vascular: negative vascular ROS. Anesthesia Plan      general     ASA 2       Induction: intravenous. MIPS: Postoperative opioids intended and Prophylactic antiemetics administered. Anesthetic plan and risks discussed with patient.                       Karmen Sapp MD   3/27/2021

## 2021-03-29 ENCOUNTER — TELEPHONE (OUTPATIENT)
Dept: FAMILY MEDICINE CLINIC | Age: 31
End: 2021-03-29

## 2021-03-29 NOTE — DISCHARGE SUMMARY
Surgery Discharge Summary    Patient Identification  Otilio Ivy is a 31 y.o. male. :  1990  Admit Date:  3/27/2021    Discharge date:   3/28/2021 11:14 AM                                   Disposition: home    Discharge Diagnoses: Active Problems:    Rectal foreign body, initial encounter  Resolved Problems:    * No resolved hospital problems. *      Discharge condition: good    Discharge Medications:     Discharge Medication List as of 3/28/2021 10:23 AM      CONTINUE these medications which have NOT CHANGED    Details   [place] [facility] [place] Disp-1 each, R-0, PrintI have evaluated this patient and Otilio Ivy needs handicap placard for 4 years. diclofenac sodium (VOLTAREN) 1 % GEL APPLY 2 GRAMS TO THE AFFECTED AREA 4 TIMES DAILY, Disp-200 g,R-5, Normal      leuprolide (LUPRON) 3.75 MG injection Inject 3.75 mg into the muscle every 30 daysHistorical Med      estradiol (ESTRACE) 2 MG tablet Take 3 mg by mouth daily Historical Med                Discharge Medication List as of 3/28/2021 10:23 AM            Most Recent Labs:    CBC:   Recent Labs     21  0524   WBC 19.8* 15.3*   HGB 13.7 12.7*   HCT 40.1* 37.8*    204     BMP:    Recent Labs     21      K 3.8      CO2 26   BUN 20   CREATININE 0.7*   GLUCOSE 129*     Hepatic:   Recent Labs     21   AST 14*   ALT 10   BILITOT 0.9   ALKPHOS 57     PT/INR:  No results for input(s): INR in the last 72 hours. Consults: none    Surgery: exam under anesthesia and removal of foreign body    Patient Instructions: Activity: no restrictions  Diet: As tolerated  Follow-up if any concerns    The patient and/or family/patient representatives, were provided education regarding discharge instructions, ongoing treatment and follow-up. Details of information given to the patient may be found in the discharge instructions located in the EMR.        [facility] and [facility]INO[facility]OGER

## 2021-03-29 NOTE — TELEPHONE ENCOUNTER
Ifeanyi Arcos [facility] Transitions Initial Follow Up Call    Outreach made within 2 business days of discharge: Yes    Patient: Betina Sullivan Patient : 1990   MRN: <[id number]>  Reason for Admission: There are no discharge diagnoses documented for the most recent discharge. Discharge Date: 3/28/21       Spoke with: Provider, patient does not need be seen by PCP for f/u     Discharge department/facility: [facility]    TCM Interactive Patient Contact:  Was patient able to fill all prescriptions: Yes  Was patient instructed to bring all medications to the follow-up visit: Yes  Is patient taking all medications as directed in the discharge summary? Yes  Does patient understand their discharge instructions: Yes  Does patient have questions or concerns that need addressed prior to 7-14 day follow up office visit: no    Scheduled appointment with PCP within 7-14 days    Follow Up  No future appointments.     Valery Weiss

## (undated) DEVICE — SOLUTION IV IRRIG POUR BRL 0.9% SODIUM CHL 2F7124

## (undated) DEVICE — UNDERPAD INCONT XL MESH PROTCT + DISP FOR MAT USE

## (undated) DEVICE — GLOVE,SURG,SENSICARE SLT,LF,PF,7: Brand: MEDLINE

## (undated) DEVICE — Device

## (undated) DEVICE — GOWN SIRUS NONREIN XL W/TWL: Brand: MEDLINE INDUSTRIES, INC.

## (undated) DEVICE — GAUZE,SPONGE,4"X4",8PLY,STRL,LF,10/TRAY: Brand: MEDLINE